# Patient Record
Sex: MALE | Race: WHITE | NOT HISPANIC OR LATINO | Employment: FULL TIME | URBAN - METROPOLITAN AREA
[De-identification: names, ages, dates, MRNs, and addresses within clinical notes are randomized per-mention and may not be internally consistent; named-entity substitution may affect disease eponyms.]

---

## 2020-06-03 ENCOUNTER — HISTORICAL (OUTPATIENT)
Dept: ADMINISTRATIVE | Facility: HOSPITAL | Age: 65
End: 2020-06-03

## 2022-04-11 ENCOUNTER — HISTORICAL (OUTPATIENT)
Dept: ADMINISTRATIVE | Facility: HOSPITAL | Age: 67
End: 2022-04-11

## 2022-04-27 VITALS
SYSTOLIC BLOOD PRESSURE: 120 MMHG | HEIGHT: 71 IN | WEIGHT: 240.31 LBS | DIASTOLIC BLOOD PRESSURE: 81 MMHG | BODY MASS INDEX: 33.64 KG/M2

## 2023-06-12 DIAGNOSIS — M54.50 LOW BACK PAIN: Primary | ICD-10-CM

## 2023-06-13 ENCOUNTER — TELEPHONE (OUTPATIENT)
Dept: NEUROSURGERY | Facility: CLINIC | Age: 68
End: 2023-06-13
Payer: COMMERCIAL

## 2023-06-19 NOTE — TELEPHONE ENCOUNTER
I called and scheduled the pt on 8/10/23 @ 130pm w/ Dr. Espinoza. The pt also informed that we should be getting disc of MRI any day now since he mailed it sometime last week.

## 2023-08-10 ENCOUNTER — OFFICE VISIT (OUTPATIENT)
Dept: NEUROSURGERY | Facility: CLINIC | Age: 68
End: 2023-08-10
Payer: COMMERCIAL

## 2023-08-10 ENCOUNTER — PATIENT MESSAGE (OUTPATIENT)
Dept: NEUROSURGERY | Facility: CLINIC | Age: 68
End: 2023-08-10

## 2023-08-10 VITALS
DIASTOLIC BLOOD PRESSURE: 67 MMHG | WEIGHT: 254 LBS | SYSTOLIC BLOOD PRESSURE: 118 MMHG | HEIGHT: 71 IN | RESPIRATION RATE: 16 BRPM | HEART RATE: 69 BPM | BODY MASS INDEX: 35.56 KG/M2

## 2023-08-10 DIAGNOSIS — M54.41 CHRONIC BILATERAL LOW BACK PAIN WITH BILATERAL SCIATICA: ICD-10-CM

## 2023-08-10 DIAGNOSIS — M51.26 LUMBAR HERNIATED DISC: ICD-10-CM

## 2023-08-10 DIAGNOSIS — M48.062 LUMBAR STENOSIS WITH NEUROGENIC CLAUDICATION: Primary | ICD-10-CM

## 2023-08-10 DIAGNOSIS — M54.42 CHRONIC BILATERAL LOW BACK PAIN WITH BILATERAL SCIATICA: ICD-10-CM

## 2023-08-10 DIAGNOSIS — G89.29 CHRONIC BILATERAL LOW BACK PAIN WITH BILATERAL SCIATICA: ICD-10-CM

## 2023-08-10 PROCEDURE — 3078F PR MOST RECENT DIASTOLIC BLOOD PRESSURE < 80 MM HG: ICD-10-PCS | Mod: CPTII,,, | Performed by: NEUROLOGICAL SURGERY

## 2023-08-10 PROCEDURE — 3074F SYST BP LT 130 MM HG: CPT | Mod: CPTII,,, | Performed by: NEUROLOGICAL SURGERY

## 2023-08-10 PROCEDURE — 3008F BODY MASS INDEX DOCD: CPT | Mod: CPTII,,, | Performed by: NEUROLOGICAL SURGERY

## 2023-08-10 PROCEDURE — 99204 PR OFFICE/OUTPT VISIT, NEW, LEVL IV, 45-59 MIN: ICD-10-PCS | Mod: ,,, | Performed by: NEUROLOGICAL SURGERY

## 2023-08-10 PROCEDURE — 1125F PR PAIN SEVERITY QUANTIFIED, PAIN PRESENT: ICD-10-PCS | Mod: CPTII,,, | Performed by: NEUROLOGICAL SURGERY

## 2023-08-10 PROCEDURE — 1160F RVW MEDS BY RX/DR IN RCRD: CPT | Mod: CPTII,,, | Performed by: NEUROLOGICAL SURGERY

## 2023-08-10 PROCEDURE — 1125F AMNT PAIN NOTED PAIN PRSNT: CPT | Mod: CPTII,,, | Performed by: NEUROLOGICAL SURGERY

## 2023-08-10 PROCEDURE — 3074F PR MOST RECENT SYSTOLIC BLOOD PRESSURE < 130 MM HG: ICD-10-PCS | Mod: CPTII,,, | Performed by: NEUROLOGICAL SURGERY

## 2023-08-10 PROCEDURE — 3008F PR BODY MASS INDEX (BMI) DOCUMENTED: ICD-10-PCS | Mod: CPTII,,, | Performed by: NEUROLOGICAL SURGERY

## 2023-08-10 PROCEDURE — 1159F MED LIST DOCD IN RCRD: CPT | Mod: CPTII,,, | Performed by: NEUROLOGICAL SURGERY

## 2023-08-10 PROCEDURE — 99204 OFFICE O/P NEW MOD 45 MIN: CPT | Mod: ,,, | Performed by: NEUROLOGICAL SURGERY

## 2023-08-10 PROCEDURE — 1159F PR MEDICATION LIST DOCUMENTED IN MEDICAL RECORD: ICD-10-PCS | Mod: CPTII,,, | Performed by: NEUROLOGICAL SURGERY

## 2023-08-10 PROCEDURE — 1160F PR REVIEW ALL MEDS BY PRESCRIBER/CLIN PHARMACIST DOCUMENTED: ICD-10-PCS | Mod: CPTII,,, | Performed by: NEUROLOGICAL SURGERY

## 2023-08-10 PROCEDURE — 3078F DIAST BP <80 MM HG: CPT | Mod: CPTII,,, | Performed by: NEUROLOGICAL SURGERY

## 2023-08-10 RX ORDER — IPRATROPIUM BROMIDE 42 UG/1
2 SPRAY, METERED NASAL 2 TIMES DAILY
COMMUNITY

## 2023-08-10 RX ORDER — CHOLECALCIFEROL (VITAMIN D3) 50 MCG
CAPSULE ORAL
COMMUNITY
Start: 2012-06-01

## 2023-08-10 RX ORDER — TRAZODONE HYDROCHLORIDE 150 MG/1
75 TABLET ORAL NIGHTLY
COMMUNITY
Start: 2023-07-30

## 2023-08-10 RX ORDER — OXYCODONE AND ACETAMINOPHEN 5; 325 MG/1; MG/1
1 TABLET ORAL EVERY 6 HOURS PRN
COMMUNITY
Start: 2023-05-31 | End: 2023-09-14 | Stop reason: ALTCHOICE

## 2023-08-10 RX ORDER — PREGABALIN 200 MG/1
200 CAPSULE ORAL 2 TIMES DAILY
COMMUNITY
Start: 2023-05-04 | End: 2023-09-14 | Stop reason: ALTCHOICE

## 2023-08-10 RX ORDER — ASPIRIN 325 MG
325 TABLET, DELAYED RELEASE (ENTERIC COATED) ORAL DAILY
COMMUNITY
Start: 2021-07-12

## 2023-08-10 RX ORDER — RIMEGEPANT SULFATE 75 MG/75MG
TABLET, ORALLY DISINTEGRATING ORAL
COMMUNITY
Start: 2023-07-03

## 2023-08-10 RX ORDER — BISACODYL 5 MG/1
1 TABLET, COATED ORAL DAILY
COMMUNITY
Start: 2012-06-01

## 2023-08-10 RX ORDER — HYDROCODONE BITARTRATE AND ACETAMINOPHEN 5; 325 MG/1; MG/1
1-2 TABLET ORAL EVERY 6 HOURS PRN
Status: ON HOLD | COMMUNITY
Start: 2023-08-02 | End: 2023-11-17 | Stop reason: HOSPADM

## 2023-08-10 RX ORDER — METOPROLOL SUCCINATE 25 MG/1
25 TABLET, EXTENDED RELEASE ORAL DAILY
COMMUNITY
Start: 2012-06-01

## 2023-08-10 RX ORDER — TAMSULOSIN HYDROCHLORIDE 0.4 MG/1
0.4 CAPSULE ORAL DAILY
COMMUNITY
Start: 2022-06-01

## 2023-08-10 RX ORDER — CHLORPHENIRAMINE MALEATE 4 MG
4 TABLET ORAL NIGHTLY
COMMUNITY
Start: 2021-06-30

## 2023-08-10 RX ORDER — FUROSEMIDE 40 MG/1
40 TABLET ORAL DAILY
COMMUNITY
Start: 2023-07-30

## 2023-08-10 RX ORDER — TRAMADOL HYDROCHLORIDE 50 MG/1
50 TABLET ORAL
COMMUNITY
Start: 2021-07-07 | End: 2023-09-14 | Stop reason: ALTCHOICE

## 2023-08-10 RX ORDER — EZETIMIBE 10 MG/1
10 TABLET ORAL DAILY
COMMUNITY
Start: 2023-08-06

## 2023-08-10 RX ORDER — MELOXICAM 7.5 MG/1
7.5 TABLET ORAL DAILY
Status: ON HOLD | COMMUNITY
Start: 2022-06-01 | End: 2023-11-17 | Stop reason: HOSPADM

## 2023-08-10 RX ORDER — ASPIRIN 325 MG
50 TABLET, DELAYED RELEASE (ENTERIC COATED) ORAL DAILY
COMMUNITY
Start: 2023-05-23

## 2023-08-10 RX ORDER — LEVOCETIRIZINE DIHYDROCHLORIDE 5 MG/1
5 TABLET, FILM COATED ORAL DAILY
COMMUNITY
Start: 2015-06-01

## 2023-08-10 RX ORDER — ATORVASTATIN CALCIUM 40 MG/1
40 TABLET, FILM COATED ORAL DAILY
COMMUNITY
Start: 2023-08-06

## 2023-08-10 NOTE — PATIENT INSTRUCTIONS
Mr. De La Vega is a 68 y.o. male who has a past medical history significant for hypertension, aortic valve replacement, and migraine headaches.  He is s/p 3 cervical spine surgeries 30 years ago in [place], [place] involving anterior fusions from C3-7.  The patient has also had 3 lumbar surgeries in the past including a L5-S1 ALIF on 09/05/2014 by Dr. Howell.   In addition, he is s/p bilateral L4-5 laminotomies on 03/06/2020 with a SI joint fusion by Dr. Calix.  Mr. De La Vega presents as a new patient in the neurosurgery clinic for chronic low back pain with radiation into his right-greater-than-left lateral legs with walking.  These symptoms have progressed in the last 9 months.  He has a normal motor exam with chronic numbness in his left lateral thigh and bilateral lower extremities below the knees distally to his feet. There are no signs of myelopathy.     I reviewed pertinent imaging studies with the patient and his wife.  A MRI lumbar spine without gadolinium demonstrates normal alignment.  Postoperative changes s/p a L5-S1 ALIF.  At L4-5, there is a left disc herniation with mild stenosis and severe left neuroforaminal narrowing.        PLAN:    Encounter Diagnoses   Name Primary?    Lumbar stenosis with neurogenic claudication Yes    Lumbar herniated disc     Chronic bilateral low back pain with bilateral sciatica      Orders Placed This Encounter   Procedures    X-Ray Lumbar Complete Including Flex And Ext    CT Lumbar Spine Without Contrast    Ambulatory referral/consult to Pain Clinic    Ambulatory referral/consult to Physical/Occupational Therapy        1.  I discussed with Mr. De La Vega that continued optimization of medical management is recommended for his chronic low back pain with neurogenic claudication.  However, if his symptoms do not improve, he may be a future candidate for surgery, specifically a redo lumbar surgery such as a left L4-5 diskectomy and right L4-5 laminotomy.    2.  I am  requesting the neurosurgery office staff to contact his cardiologist Dr. Amador Hurley in [place], [place].  Mr. De La Vega will need preoperative cardiac clearance as well as permission for him to discontinue taking aspirin 10 days preoperatively.    3.  I am ordering lumbar spine x-rays with AP lateral and flexion-extension views and a CT lumbar spine without contrast to further evaluate his spinal anatomy.  The patient will be contacted with these radiographic results and any updates to the plan of care.    4.  Mr. De La Vega is being referred to physical therapy.      5.  In addition, he is being referred to a pain management specialist for evaluation and consideration of a lumbar epidural steroid injection.    6.  Mr. De La Vega is recommended to follow up with this established urologist Dr. Salazar.     7.  The patient's BMI is elevated at 35.43.  We discussed weight loss goals with diet and exercise.  Maintaining a healthy weight and strengthening core muscles are important factors for reducing pain along the spine.     8.  Arrangements are being made for Mr. De La Vega to follow up in my neurosurgery clinic in 6 weeks.

## 2023-08-10 NOTE — PROGRESS NOTES
"[facility]   Neurosurgery      Jose De La Vega  MRN: [id number], CSN: [id number]      : 1955   Age: 68 y.o. male  Payor: [facility] BLUE SHIELD / Plan: BCBS ALL OUT OF STATE / Product Type: PPO /       Ref:  Order, Paper  No address on file    PCP: Harper, Primary Doctor    Visit Date: 8/10/2023     There is no problem list on file for this patient.      SUBJECTIVE:      CC:   Chief Complaint   Patient presents with    low back pain radiating down b LE       HPI:   Mr. De La Vega is a 68 y.o. male who has a past medical history significant for hypertension, aortic valve replacement, and migraine headaches.  He is s/p 3 cervical spine surgeries 30 years ago in [place], [place] involving anterior fusions from C3-7.  The patient has also had 3 lumbar surgeries in the past including a L5-S1 ALIF on 2014 by Dr. Howell.   In addition, he is s/p bilateral L4-5 laminotomies on 2020 with a SI joint fusion by Dr. Calix.  Mr. De La Vega presents as a new patient in the neurosurgery clinic for chronic low back pain with radiation into his right-greater-than-left lateral legs with walking.  These symptoms have progressed in the last 9 months.    Mr. De La Vega visits the neurosurgery clinic with his wife.  He reports improvement after each of his spinal surgeries.  However, in the last 9 months, the patient has been experiencing progressively worsening low back pain with radiation into his bilateral lateral legs.  He first noticed this in the right leg, which is followed by pain in his left leg.  With prolonged standing and walking, Mr. De La Vega has the sensation of his legs feeling "very heavy".  Since having shingles 5 months ago, the patient has been experiencing numbness in his left lateral thigh.  In addition, the patient has chronic numbness from his bilateral knees to his feet.  He has been ambulating without assistance, although there is a positive shopping cart sign.  There " have been no reports of bowel or bladder incontinence, although the patient describes recent issues with ED.     Mr. De La Vega is an [employer] .  He takes 1 hydrocodone on Sundays between Hindu services.  The patient also takes Lyrica with the chronic numbness in his bilateral feet.  Mr. De La Vega has not participated in physical therapy.  He is not currently being followed by a pain management specialist, but has completed lumbar epidural steroid injections several years ago.      There are no problems to display for this patient.    Past Medical History:   Diagnosis Date    Essential (primary) hypertension     High cholesterol     Lumbar pain     Migraine     Seasonal allergies      Past Surgical History:   Procedure Laterality Date    AORTIC VALVE REPLACEMENT      CERVICAL SPINE SURGERY      C4-C7    KNEE SURGERY Left     LUMBAR SPINE SURGERY      2015 & 2020    TONSILLECTOMY      VASECTOMY      WRIST SURGERY Right        Current Outpatient Medications:     aspirin (ECOTRIN) 325 MG EC tablet, Take 325 mg by mouth once daily., Disp: , Rfl:     atorvastatin (LIPITOR) 40 MG tablet, Take 40 mg by mouth once daily., Disp: , Rfl:     chlorpheniramine (CHLOR-TRIMETON) 4 mg tablet, Take 4 mg by mouth once daily., Disp: , Rfl:     co-enzyme Q-10 50 mg capsule, Take 50 mg by mouth., Disp: , Rfl:     ezetimibe (ZETIA) 10 mg tablet, Take 10 mg by mouth once daily., Disp: , Rfl:     furosemide (LASIX) 40 MG tablet, Take 40 mg by mouth once daily., Disp: , Rfl:     HYDROcodone-acetaminophen (NORCO) 5-325 mg per tablet, Take 1-2 tablets by mouth every 6 (six) hours as needed., Disp: , Rfl:     INV potassium chloride SA (K-DUR,KLOR-CON) 20 mEq Tab, , Disp: , Rfl:     ipratropium (ATROVENT) 42 mcg (0.06 %) nasal spray, 2 sprays by Nasal route 2 (two) times daily., Disp: , Rfl:     levocetirizine (XYZAL) 5 MG tablet, Take 5 mg by mouth once daily., Disp: , Rfl:     meloxicam (MOBIC) 7.5 MG tablet, Take 7.5 mg by mouth  once daily., Disp: , Rfl:     metoprolol succinate (TOPROL-XL) 25 MG 24 hr tablet, Take 25 mg by mouth once daily., Disp: , Rfl:     multivitamin-minerals-lutein (MULTIVITAMIN 50 PLUS) Tab, , Disp: , Rfl:     NURTEC 75 mg odt, SMARTSI Tablet(s) Sublingual Every Other Day, Disp: , Rfl:     omega 3-dha-epa-fish oil (FISH OIL) 100-160-1,000 mg Cap, , Disp: , Rfl:     oxyCODONE-acetaminophen (PERCOCET) 5-325 mg per tablet, Take 1 tablet by mouth every 6 (six) hours as needed., Disp: , Rfl:     pregabalin (LYRICA) 200 MG Cap, Take 200 mg by mouth 2 (two) times daily., Disp: , Rfl:     tamsulosin (FLOMAX) 0.4 mg Cap, Take 0.4 mg by mouth once daily., Disp: , Rfl:     testosterone 100 mg pellet, , Disp: , Rfl:     traMADoL (ULTRAM) 50 mg tablet, Take 50 mg by mouth., Disp: , Rfl:     traZODone (DESYREL) 150 MG tablet, Take 150 mg by mouth every evening., Disp: , Rfl:     Review of patient's allergies indicates:   Allergen Reactions    Sumatriptan Anaphylaxis    [id number] antimigraine agents        Social History     Tobacco Use    Smoking status: Never    Smokeless tobacco: Never   Substance Use Topics    Alcohol use: Not on file     Occupation: [employer]  currently living in [place], [place]. He previously resided in [place], [place].     History reviewed. No pertinent family history.    ROS:  Constitutional:  Negative for chills and fever.   HENT:  Negative for congestion and sore throat.    Eyes:  Negative for blurred vision and double vision.   Respiratory:  Negative for cough and shortness of breath.    Cardiovascular:  Negative for chest pain and palpitations.   Gastrointestinal:  Negative for constipation, diarrhea, nausea and vomiting.   Musculoskeletal:  Positive for back pain, Negative for neck pain.   Neurological:  Positive for sensory change, Negative for focal weakness and headaches.   Endo/Heme/Allergies:  Does not bruise/bleed easily.   Psychiatric/Behavioral:  Negative for depression and  "anxiety.      OBJECTIVE:     EXAMINATION:  /67 (BP Location: Left arm, Patient Position: Sitting)   Pulse 69   Resp 16   Ht 5' 11" (1.803 m)   Wt 115.2 kg (254 lb)   BMI 35.43 kg/m²   Body Habitus: Significantly Obese    Physical Exam:  Constitutional: The patient is well-developed and cooperative, sitting comfortably in a chair  Extremities: Edema in b LE, palpable DP pulses bilaterally    Mental Status:   Oriented to person, place, and time  Normal speech    Cranial nerves:  CN II: PERRL, 4 to 3 mm, brisk bilaterally  CN III, IV, and VI: extraocular movements normal, no ptosis  CN V: normal facial sensation and masseter function  CN VII: facial strength normal and symmetrical  CN VIII: hearing normal bilaterally  CN IX and X: swallowing and phonation normal  CN XI: shoulder shrug intact bilaterally  CN XII: tongue protrusion midline    Motor:  Muscle bulk: normal in all extremities  Tone: normal in all extremities    Upper extremities:  Deltoid: right 5/5; left 5/5  Biceps: right 5/5; left 5/5  Triceps: right 5/5; left 5/5  Wrist extensors: right 5/5; left 5/5  Wrist flexors: right 5/5; left 5/5  : right 5/5; left 5/5  Interosseous muscles: right 5/5; left 5/5    Lower extremities:  Hip flexors: right 5/5; left 5/5  Knee extensors: right 5/5; left 5/5  Knee flexors: right 5/5; left 5/5  Foot dorsiflexors: right 5/5; left 5/5  Foot plantar flexors: right 5/5; left 5/5  Extensor hallucis longus: right 5/5; left 5/5    Sensation:  Normal to light touch x 4 extremities, except for decreased sensation to light touch in left lateral thigh and bilateral knees distally to feet    Reflexes:  Biceps: right 1+/4; left 1+/4  Brachioradialis: right 1+/4; left 1+/4  Triceps: right 1+/4; left 1+/4  Knee: right 1+/4; left 1+/4  Ankle: right 0/4; left 0/4    Cruzs sign: right negative; left negative  Babinski: right downgoing; left downgoing  Clonus: right negative; left negative    Coordination:  Finger to " nose: right normal; left normal    Musculoskeletal:    Gait:  Toe walk- normal  Heel walk- normal  Tandem gait- normal    Straight leg test: right negative; left negative    Cervical: No pain with palpation, R transverse anterior neck scar  Upper back: No pain with palpation  Lower back: No pain with palpation, well healed midline scar      DIAGNOSTICS REVIEW OF IMAGING, LAB & OTHER STUDIES:  I have personally reviewed and evaluated the following reports as well as radiographic studies:    MRI lumbar spine without gadolinium, 05/09/2023- there is normal alignment.  Postoperative changes s/p a L5-S1 ALIF.  At L4-5, there is a left disc herniation with severe left neuroforaminal narrowing.      ASSESSMENT:  Mr. De La Vega is a 68 y.o. male who has a past medical history significant for hypertension, aortic valve replacement, and migraine headaches.  He is s/p 3 cervical spine surgeries 30 years ago in [place], [place] involving anterior fusions from C3-7.  The patient has also had 3 lumbar surgeries in the past including a L5-S1 ALIF on 09/05/2014 by Dr. Howell.   In addition, he is s/p bilateral L4-5 laminotomies on 03/06/2020 with a SI joint fusion by Dr. Calix.  Mr. De La Vega presents as a new patient in the neurosurgery clinic for chronic low back pain with radiation into his right-greater-than-left lateral legs with walking.  These symptoms have progressed in the last 9 months.  He has a normal motor exam with chronic numbness in his left lateral thigh and bilateral lower extremities below the knees distally to his feet. There are no signs of myelopathy.     I reviewed pertinent imaging studies with the patient and his wife.  A MRI lumbar spine without gadolinium demonstrates normal alignment.  Postoperative changes s/p a L5-S1 ALIF.  At L4-5, there is a left disc herniation with mild stenosis and severe left neuroforaminal narrowing.        PLAN:    Encounter Diagnoses   Name Primary?    Lumbar stenosis with  neurogenic claudication Yes    Lumbar herniated disc     Chronic bilateral low back pain with bilateral sciatica      Orders Placed This Encounter   Procedures    X-Ray Lumbar Complete Including Flex And Ext    CT Lumbar Spine Without Contrast    Ambulatory referral/consult to Pain Clinic    Ambulatory referral/consult to Physical/Occupational Therapy        1.  I discussed with Mr. De La Vega that continued optimization of medical management is recommended for his chronic low back pain with neurogenic claudication.  However, if his symptoms do not improve, he may be a future candidate for surgery, specifically a redo lumbar surgery such as a left L4-5 diskectomy and right L4-5 laminotomy.    2.  I am requesting the neurosurgery office staff to contact his cardiologist Dr. Amador Hurley in [place], [place].  Mr. De La Vega will need preoperative cardiac clearance as well as permission for him to discontinue taking aspirin 10 days preoperatively.    3.  I am ordering lumbar spine x-rays with AP lateral and flexion-extension views and a CT lumbar spine without contrast to further evaluate his spinal anatomy.  The patient will be contacted with these radiographic results and any updates to the plan of care.    4.  Mr. De La Vega is being referred to physical therapy.      5.  In addition, he is being referred to a pain management specialist for evaluation and consideration of a lumbar epidural steroid injection.    6.  Mr. De La Vega is recommended to follow up with this established urologist Dr. Salazar.     7.  The patient's BMI is elevated at 35.43.  We discussed weight loss goals with diet and exercise.  Maintaining a healthy weight and strengthening core muscles are important factors for reducing pain along the spine.     8.  Arrangements are being made for Mr. De La Vega to follow up in my neurosurgery clinic in 6 weeks.      This note will be sent to the patient's referring provider Paper Order and  primary care provider No, Primary Doctor.           Lexie Espinoza MD  Neurosurgeon

## 2023-08-22 ENCOUNTER — PATIENT MESSAGE (OUTPATIENT)
Dept: NEUROSURGERY | Facility: CLINIC | Age: 68
End: 2023-08-22
Payer: COMMERCIAL

## 2023-08-30 ENCOUNTER — PATIENT MESSAGE (OUTPATIENT)
Dept: NEUROSURGERY | Facility: CLINIC | Age: 68
End: 2023-08-30
Payer: COMMERCIAL

## 2023-09-07 ENCOUNTER — PATIENT MESSAGE (OUTPATIENT)
Dept: NEUROSURGERY | Facility: CLINIC | Age: 68
End: 2023-09-07
Payer: COMMERCIAL

## 2023-09-13 NOTE — PROGRESS NOTES
Subjective:      Patient ID: Jose De La Vega is a 68 y.o. male.    Chief Complaint: Back Pain (Referred from Dr Saleem to have injection of the lumbar. MRI results in chart. Has pain that radiates down the legs mostly the right. Has numbness and tingling. Takes RX which helps some. Present going to PT which is not really helping.  Pain level today is  6/10.)    Referred by: Lexie Espinoza MD     HPI:  This is a pleasant 68-year-old  male patient presenting as a new consult from neurosurgeon Dr. Anjeliac Espinoza to evaluate for chronic bilateral low back pain with bilateral sciatica along with lumbar stenosis with neurogenic claudication and lumbar herniated disc.  Patient no longer taking tramadol. He is taking pregabalin 150  mg BID.  He current meloxicam 7.5 mg daily and Norco 5/325 once a day and on a bad day takes this twice.     Patient has bilateral low back pain along with bilateral buttock pain that radiates primarily down the right lateral thigh and wraps to the lower right quadricep region.  On occasion he will have radiation down the left lateral thigh and stops at the knee.  Pain going down his leg feels like an electric shock.  Pain to his bilateral low back and buttocks feels like a toothache.  Most pressing pain is the pain going down the right leg.  He also has pertinent past surgical history of cervical and lumbar spinal surgery.  Pain is worse and will elevate to 08/09/2010 after standing and walking too long his legs feel heavy.  If he is grocery shopping for long periods of time he needs to lean forward.  He can perform normal activities of daily living without pain however he is not able to complete house hold chores or yd work in the pain will occasionally wake him up at night.  His location is a .  His pain will reduce sitting to a 2-3/10.  He completed 4 weeks of physical therapy and had to stop as it was exacerbating his pain to the back and legs.  He reports Norco dull  "the pain but it give minimal relief with the numbness.  He would like to proceed with a repeat epidural steroid injection down the right leg as that is the most problematic area.  Currently pain is not waking him up at night.              Vital signs:   Vitals:    09/14/23 0937   BP: 115/70   Pulse: 69   Temp: 98.1 °F (36.7 °C)   Weight: 113.4 kg (250 lb)   Height: 5' 11" (1.803 m)   PainSc:   6     Body mass index is 34.87 kg/m².  Pain Disability Index (PDI): 32       Interventional Pain History      ROS: Low back and leg pain    MRI lumbar spine [facility] 05/09/2023:: Low back and leg pain  (See full report in )          Objective:          Physical Exam  General: Well developed; overweight; A&O x 3; No anxiety/depression; NAD  Mental Status: Oriented to person, palce and time. Displays appropriate mood & affect.  Head: Norm cephalic and atraumatic  Neck:  No cervical paraspinal banding.  Full range of motion with lateral turning and cervical flexion +extension.  Eyes: normal conjunctiva, normal lids, normal pupils  ENT and mouth: normal external ear, nose, and no lesions noted on the lips.  Respiratory: Symmetrical, Unlabored. No dyspnea  CV: normal rhythm and rate. No peripheral edema.   Abdomen: Non-distended    Extremities:  Gen: No cyanosis or tenderness to palpation bilateral upper and lower extremities  Skin: Warm, pink, dry, no rashes, no lesions on the lumbar spine  Strength: 5/5 motor strength bilateral upper and lower extremities  ROM: Full ROM in bilateral knees and ankles without pain or instability.    Neuro:  Gait:  Positive antalgic lean, normal toe and heel raise. Independent ambulator.  DTR's: 2+ in bilateral patellar, and ankle  Sensory: Intact to light touch bilateral  upper and lower extremities with some numbness to bilateral toes occurred after his lumbar spinal surgery    Spine: Normal lordosis. No scoliosis  L-spine ROM: Full ROM to flexion, extension, bilateral " rotation,   Straight Leg Raise:  Positive right,   SI Joint: No tenderness to palpation bilaterally.               Assessment:     A/P:  Patient has ongoing chronic bilateral low back pain buttock that radiates primarily down the right lateral leg wrapping around to the right lower quad stopping at the knee in the L4 and L5 dermatome.  MRI lumbar spine shows L4-5 intervertebral disc with mild desiccation and diffuse disc protrusion slightly eccentric to the left.  There is left extraforaminal protrusion as well.  Bilateral facet overgrowth noted.  Changes cause moderate diffuse anterior canal and severe left foraminal stenosis.  Right exit foramina patent.    Request submitted for a right transforaminal epidural steroid injection to L4 +L5  Patient will follow-up for a preop appointment    Encounter Diagnoses   Name Primary?    Chronic bilateral low back pain with bilateral sciatica Yes    Lumbar stenosis with neurogenic claudication     Lumbar herniated disc          Plan:       Jose was seen today for back pain.    Diagnoses and all orders for this visit:    Chronic bilateral low back pain with bilateral sciatica  -     Ambulatory referral/consult to Pain Clinic    Lumbar stenosis with neurogenic claudication  -     Ambulatory referral/consult to Pain Clinic    Lumbar herniated disc  -     Ambulatory referral/consult to Pain Clinic               Past Medical History:   Diagnosis Date    Arthritis     Essential (primary) hypertension     Heart murmur     High cholesterol     Lumbar pain     Migraine     Seasonal allergies     Sleep apnea        Past Surgical History:   Procedure Laterality Date    AORTIC VALVE REPLACEMENT      CERVICAL SPINE SURGERY      C4-C7    KNEE SURGERY Left     LUMBAR SPINE SURGERY      2015 & 2020    TONSILLECTOMY      VASECTOMY      WRIST SURGERY Right        Family History   Problem Relation Age of Onset    No Known Problems Mother     No Known Problems Father        Social History      Socioeconomic History    Marital status:    Tobacco Use    Smoking status: Never    Smokeless tobacco: Never   Substance and Sexual Activity    Alcohol use: Yes     Alcohol/week: 2.0 standard drinks of alcohol     Types: 2 Shots of liquor per week    Drug use: Never       Current Outpatient Medications   Medication Sig Dispense Refill    aspirin (ECOTRIN) 325 MG EC tablet Take 325 mg by mouth once daily.      atorvastatin (LIPITOR) 40 MG tablet Take 40 mg by mouth once daily.      chlorpheniramine (CHLOR-TRIMETON) 4 mg tablet Take 4 mg by mouth once daily.      co-enzyme Q-10 50 mg capsule Take 50 mg by mouth.      ezetimibe (ZETIA) 10 mg tablet Take 10 mg by mouth once daily.      furosemide (LASIX) 40 MG tablet Take 40 mg by mouth once daily.      HYDROcodone-acetaminophen (NORCO) 5-325 mg per tablet Take 1-2 tablets by mouth every 6 (six) hours as needed.      INV potassium chloride SA (K-DUR,KLOR-CON) 20 mEq Tab       ipratropium (ATROVENT) 42 mcg (0.06 %) nasal spray 2 sprays by Nasal route 2 (two) times daily.      levocetirizine (XYZAL) 5 MG tablet Take 5 mg by mouth once daily.      meloxicam (MOBIC) 7.5 MG tablet Take 7.5 mg by mouth once daily.      metoprolol succinate (TOPROL-XL) 25 MG 24 hr tablet Take 25 mg by mouth once daily.      multivitamin-minerals-lutein (MULTIVITAMIN 50 PLUS) Tab       NURTEC 75 mg odt SMARTSI Tablet(s) Sublingual Every Other Day      omega 3-dha-epa-fish oil (FISH OIL) 100-160-1,000 mg Cap       tamsulosin (FLOMAX) 0.4 mg Cap Take 0.4 mg by mouth once daily.      testosterone 100 mg pellet       traZODone (DESYREL) 150 MG tablet Take 150 mg by mouth every evening.       No current facility-administered medications for this visit.       Review of patient's allergies indicates:   Allergen Reactions    Sumatriptan Anaphylaxis    [id number] antimigraine agents

## 2023-09-13 NOTE — H&P (VIEW-ONLY)
Subjective:      Patient ID: Jose De La Vega is a 68 y.o. male.    Chief Complaint: Back Pain (Referred from Dr Saleem to have injection of the lumbar. MRI results in chart. Has pain that radiates down the legs mostly the right. Has numbness and tingling. Takes RX which helps some. Present going to PT which is not really helping.  Pain level today is  6/10.)    Referred by: Lexie Espinoza MD     HPI:  This is a pleasant 68-year-old  male patient presenting as a new consult from neurosurgeon Dr. Anjelica Espinoza to evaluate for chronic bilateral low back pain with bilateral sciatica along with lumbar stenosis with neurogenic claudication and lumbar herniated disc.  Patient no longer taking tramadol. He is taking pregabalin 150  mg BID.  He current meloxicam 7.5 mg daily and Norco 5/325 once a day and on a bad day takes this twice.     Patient has bilateral low back pain along with bilateral buttock pain that radiates primarily down the right lateral thigh and wraps to the lower right quadricep region.  On occasion he will have radiation down the left lateral thigh and stops at the knee.  Pain going down his leg feels like an electric shock.  Pain to his bilateral low back and buttocks feels like a toothache.  Most pressing pain is the pain going down the right leg.  He also has pertinent past surgical history of cervical and lumbar spinal surgery.  Pain is worse and will elevate to 08/09/2010 after standing and walking too long his legs feel heavy.  If he is grocery shopping for long periods of time he needs to lean forward.  He can perform normal activities of daily living without pain however he is not able to complete house hold chores or yd work in the pain will occasionally wake him up at night.  His location is a .  His pain will reduce sitting to a 2-3/10.  He completed 4 weeks of physical therapy and had to stop as it was exacerbating his pain to the back and legs.  He reports Norco dull  "the pain but it give minimal relief with the numbness.  He would like to proceed with a repeat epidural steroid injection down the right leg as that is the most problematic area.  Currently pain is not waking him up at night.              Vital signs:   Vitals:    09/14/23 0937   BP: 115/70   Pulse: 69   Temp: 98.1 °F (36.7 °C)   Weight: 113.4 kg (250 lb)   Height: 5' 11" (1.803 m)   PainSc:   6     Body mass index is 34.87 kg/m².  Pain Disability Index (PDI): 32       Interventional Pain History      ROS: Low back and leg pain    MRI lumbar spine [facility] 05/09/2023:: Low back and leg pain  (See full report in )          Objective:          Physical Exam  General: Well developed; overweight; A&O x 3; No anxiety/depression; NAD  Mental Status: Oriented to person, palce and time. Displays appropriate mood & affect.  Head: Norm cephalic and atraumatic  Neck:  No cervical paraspinal banding.  Full range of motion with lateral turning and cervical flexion +extension.  Eyes: normal conjunctiva, normal lids, normal pupils  ENT and mouth: normal external ear, nose, and no lesions noted on the lips.  Respiratory: Symmetrical, Unlabored. No dyspnea  CV: normal rhythm and rate. No peripheral edema.   Abdomen: Non-distended    Extremities:  Gen: No cyanosis or tenderness to palpation bilateral upper and lower extremities  Skin: Warm, pink, dry, no rashes, no lesions on the lumbar spine  Strength: 5/5 motor strength bilateral upper and lower extremities  ROM: Full ROM in bilateral knees and ankles without pain or instability.    Neuro:  Gait:  Positive antalgic lean, normal toe and heel raise. Independent ambulator.  DTR's: 2+ in bilateral patellar, and ankle  Sensory: Intact to light touch bilateral  upper and lower extremities with some numbness to bilateral toes occurred after his lumbar spinal surgery    Spine: Normal lordosis. No scoliosis  L-spine ROM: Full ROM to flexion, extension, bilateral " rotation,   Straight Leg Raise:  Positive right,   SI Joint: No tenderness to palpation bilaterally.               Assessment:     A/P:  Patient has ongoing chronic bilateral low back pain buttock that radiates primarily down the right lateral leg wrapping around to the right lower quad stopping at the knee in the L4 and L5 dermatome.  MRI lumbar spine shows L4-5 intervertebral disc with mild desiccation and diffuse disc protrusion slightly eccentric to the left.  There is left extraforaminal protrusion as well.  Bilateral facet overgrowth noted.  Changes cause moderate diffuse anterior canal and severe left foraminal stenosis.  Right exit foramina patent.    Request submitted for a right transforaminal epidural steroid injection to L4 +L5  Patient will follow-up for a preop appointment    Encounter Diagnoses   Name Primary?    Chronic bilateral low back pain with bilateral sciatica Yes    Lumbar stenosis with neurogenic claudication     Lumbar herniated disc          Plan:       Jose was seen today for back pain.    Diagnoses and all orders for this visit:    Chronic bilateral low back pain with bilateral sciatica  -     Ambulatory referral/consult to Pain Clinic    Lumbar stenosis with neurogenic claudication  -     Ambulatory referral/consult to Pain Clinic    Lumbar herniated disc  -     Ambulatory referral/consult to Pain Clinic               Past Medical History:   Diagnosis Date    Arthritis     Essential (primary) hypertension     Heart murmur     High cholesterol     Lumbar pain     Migraine     Seasonal allergies     Sleep apnea        Past Surgical History:   Procedure Laterality Date    AORTIC VALVE REPLACEMENT      CERVICAL SPINE SURGERY      C4-C7    KNEE SURGERY Left     LUMBAR SPINE SURGERY      2015 & 2020    TONSILLECTOMY      VASECTOMY      WRIST SURGERY Right        Family History   Problem Relation Age of Onset    No Known Problems Mother     No Known Problems Father        Social History      Socioeconomic History    Marital status:    Tobacco Use    Smoking status: Never    Smokeless tobacco: Never   Substance and Sexual Activity    Alcohol use: Yes     Alcohol/week: 2.0 standard drinks of alcohol     Types: 2 Shots of liquor per week    Drug use: Never       Current Outpatient Medications   Medication Sig Dispense Refill    aspirin (ECOTRIN) 325 MG EC tablet Take 325 mg by mouth once daily.      atorvastatin (LIPITOR) 40 MG tablet Take 40 mg by mouth once daily.      chlorpheniramine (CHLOR-TRIMETON) 4 mg tablet Take 4 mg by mouth once daily.      co-enzyme Q-10 50 mg capsule Take 50 mg by mouth.      ezetimibe (ZETIA) 10 mg tablet Take 10 mg by mouth once daily.      furosemide (LASIX) 40 MG tablet Take 40 mg by mouth once daily.      HYDROcodone-acetaminophen (NORCO) 5-325 mg per tablet Take 1-2 tablets by mouth every 6 (six) hours as needed.      INV potassium chloride SA (K-DUR,KLOR-CON) 20 mEq Tab       ipratropium (ATROVENT) 42 mcg (0.06 %) nasal spray 2 sprays by Nasal route 2 (two) times daily.      levocetirizine (XYZAL) 5 MG tablet Take 5 mg by mouth once daily.      meloxicam (MOBIC) 7.5 MG tablet Take 7.5 mg by mouth once daily.      metoprolol succinate (TOPROL-XL) 25 MG 24 hr tablet Take 25 mg by mouth once daily.      multivitamin-minerals-lutein (MULTIVITAMIN 50 PLUS) Tab       NURTEC 75 mg odt SMARTSI Tablet(s) Sublingual Every Other Day      omega 3-dha-epa-fish oil (FISH OIL) 100-160-1,000 mg Cap       tamsulosin (FLOMAX) 0.4 mg Cap Take 0.4 mg by mouth once daily.      testosterone 100 mg pellet       traZODone (DESYREL) 150 MG tablet Take 150 mg by mouth every evening.       No current facility-administered medications for this visit.       Review of patient's allergies indicates:   Allergen Reactions    Sumatriptan Anaphylaxis    [id number] antimigraine agents

## 2023-09-14 ENCOUNTER — OFFICE VISIT (OUTPATIENT)
Dept: PAIN MEDICINE | Facility: CLINIC | Age: 68
End: 2023-09-14
Payer: COMMERCIAL

## 2023-09-14 VITALS
SYSTOLIC BLOOD PRESSURE: 115 MMHG | DIASTOLIC BLOOD PRESSURE: 70 MMHG | BODY MASS INDEX: 35 KG/M2 | WEIGHT: 250 LBS | HEART RATE: 69 BPM | TEMPERATURE: 98 F | HEIGHT: 71 IN

## 2023-09-14 DIAGNOSIS — M48.062 LUMBAR STENOSIS WITH NEUROGENIC CLAUDICATION: ICD-10-CM

## 2023-09-14 DIAGNOSIS — G89.29 CHRONIC BILATERAL LOW BACK PAIN WITH BILATERAL SCIATICA: Primary | ICD-10-CM

## 2023-09-14 DIAGNOSIS — M54.42 CHRONIC BILATERAL LOW BACK PAIN WITH BILATERAL SCIATICA: Primary | ICD-10-CM

## 2023-09-14 DIAGNOSIS — M51.26 LUMBAR HERNIATED DISC: ICD-10-CM

## 2023-09-14 DIAGNOSIS — M54.41 CHRONIC BILATERAL LOW BACK PAIN WITH BILATERAL SCIATICA: Primary | ICD-10-CM

## 2023-09-14 PROCEDURE — 1160F PR REVIEW ALL MEDS BY PRESCRIBER/CLIN PHARMACIST DOCUMENTED: ICD-10-PCS | Mod: CPTII,,, | Performed by: NURSE PRACTITIONER

## 2023-09-14 PROCEDURE — 1125F PR PAIN SEVERITY QUANTIFIED, PAIN PRESENT: ICD-10-PCS | Mod: CPTII,,, | Performed by: NURSE PRACTITIONER

## 2023-09-14 PROCEDURE — 3008F PR BODY MASS INDEX (BMI) DOCUMENTED: ICD-10-PCS | Mod: CPTII,,, | Performed by: NURSE PRACTITIONER

## 2023-09-14 PROCEDURE — 1160F RVW MEDS BY RX/DR IN RCRD: CPT | Mod: CPTII,,, | Performed by: NURSE PRACTITIONER

## 2023-09-14 PROCEDURE — 3008F BODY MASS INDEX DOCD: CPT | Mod: CPTII,,, | Performed by: NURSE PRACTITIONER

## 2023-09-14 PROCEDURE — 1100F PR PT FALLS ASSESS DOC 2+ FALLS/FALL W/INJURY/YR: ICD-10-PCS | Mod: CPTII,,, | Performed by: NURSE PRACTITIONER

## 2023-09-14 PROCEDURE — 3078F PR MOST RECENT DIASTOLIC BLOOD PRESSURE < 80 MM HG: ICD-10-PCS | Mod: CPTII,,, | Performed by: NURSE PRACTITIONER

## 2023-09-14 PROCEDURE — 1159F MED LIST DOCD IN RCRD: CPT | Mod: CPTII,,, | Performed by: NURSE PRACTITIONER

## 2023-09-14 PROCEDURE — 99204 OFFICE O/P NEW MOD 45 MIN: CPT | Mod: ,,, | Performed by: NURSE PRACTITIONER

## 2023-09-14 PROCEDURE — 3074F PR MOST RECENT SYSTOLIC BLOOD PRESSURE < 130 MM HG: ICD-10-PCS | Mod: CPTII,,, | Performed by: NURSE PRACTITIONER

## 2023-09-14 PROCEDURE — 3074F SYST BP LT 130 MM HG: CPT | Mod: CPTII,,, | Performed by: NURSE PRACTITIONER

## 2023-09-14 PROCEDURE — 3078F DIAST BP <80 MM HG: CPT | Mod: CPTII,,, | Performed by: NURSE PRACTITIONER

## 2023-09-14 PROCEDURE — 1159F PR MEDICATION LIST DOCUMENTED IN MEDICAL RECORD: ICD-10-PCS | Mod: CPTII,,, | Performed by: NURSE PRACTITIONER

## 2023-09-14 PROCEDURE — 1100F PTFALLS ASSESS-DOCD GE2>/YR: CPT | Mod: CPTII,,, | Performed by: NURSE PRACTITIONER

## 2023-09-14 PROCEDURE — 1125F AMNT PAIN NOTED PAIN PRSNT: CPT | Mod: CPTII,,, | Performed by: NURSE PRACTITIONER

## 2023-09-14 PROCEDURE — 3288F FALL RISK ASSESSMENT DOCD: CPT | Mod: CPTII,,, | Performed by: NURSE PRACTITIONER

## 2023-09-14 PROCEDURE — 3288F PR FALLS RISK ASSESSMENT DOCUMENTED: ICD-10-PCS | Mod: CPTII,,, | Performed by: NURSE PRACTITIONER

## 2023-09-14 PROCEDURE — 99204 PR OFFICE/OUTPT VISIT, NEW, LEVL IV, 45-59 MIN: ICD-10-PCS | Mod: ,,, | Performed by: NURSE PRACTITIONER

## 2023-10-02 ENCOUNTER — PATIENT MESSAGE (OUTPATIENT)
Dept: ADMINISTRATIVE | Facility: OTHER | Age: 68
End: 2023-10-02
Payer: COMMERCIAL

## 2023-10-03 ENCOUNTER — PATIENT MESSAGE (OUTPATIENT)
Dept: ADMINISTRATIVE | Facility: OTHER | Age: 68
End: 2023-10-03
Payer: COMMERCIAL

## 2023-10-04 ENCOUNTER — ANESTHESIA EVENT (OUTPATIENT)
Dept: SURGERY | Facility: HOSPITAL | Age: 68
End: 2023-10-04
Payer: COMMERCIAL

## 2023-10-04 RX ORDER — SODIUM CHLORIDE, SODIUM LACTATE, POTASSIUM CHLORIDE, CALCIUM CHLORIDE 600; 310; 30; 20 MG/100ML; MG/100ML; MG/100ML; MG/100ML
INJECTION, SOLUTION INTRAVENOUS CONTINUOUS
Status: CANCELLED | OUTPATIENT
Start: 2023-10-04

## 2023-10-04 RX ORDER — SODIUM CHLORIDE 9 MG/ML
INJECTION, SOLUTION INTRAVENOUS CONTINUOUS
Status: CANCELLED | OUTPATIENT
Start: 2023-10-04

## 2023-10-04 RX ORDER — SODIUM CHLORIDE, SODIUM GLUCONATE, SODIUM ACETATE, POTASSIUM CHLORIDE AND MAGNESIUM CHLORIDE 30; 37; 368; 526; 502 MG/100ML; MG/100ML; MG/100ML; MG/100ML; MG/100ML
INJECTION, SOLUTION INTRAVENOUS CONTINUOUS
Status: CANCELLED | OUTPATIENT
Start: 2023-10-04 | End: 2023-11-03

## 2023-10-04 RX ORDER — ONDANSETRON 2 MG/ML
4 INJECTION INTRAMUSCULAR; INTRAVENOUS DAILY PRN
Status: CANCELLED | OUTPATIENT
Start: 2023-10-04

## 2023-10-05 ENCOUNTER — PATIENT MESSAGE (OUTPATIENT)
Dept: ADMINISTRATIVE | Facility: OTHER | Age: 68
End: 2023-10-05
Payer: COMMERCIAL

## 2023-10-05 ENCOUNTER — ANESTHESIA (OUTPATIENT)
Dept: SURGERY | Facility: HOSPITAL | Age: 68
End: 2023-10-05
Payer: COMMERCIAL

## 2023-10-05 ENCOUNTER — HOSPITAL ENCOUNTER (OUTPATIENT)
Facility: HOSPITAL | Age: 68
Discharge: HOME | End: 2023-10-05
Attending: ANESTHESIOLOGY | Admitting: ANESTHESIOLOGY
Payer: COMMERCIAL

## 2023-10-05 DIAGNOSIS — G89.29 CHRONIC BACK PAIN GREATER THAN 3 MONTHS DURATION: ICD-10-CM

## 2023-10-05 DIAGNOSIS — M54.9 CHRONIC BACK PAIN GREATER THAN 3 MONTHS DURATION: ICD-10-CM

## 2023-10-05 PROCEDURE — 64483 NJX AA&/STRD TFRM EPI L/S 1: CPT | Mod: RT | Performed by: ANESTHESIOLOGY

## 2023-10-05 PROCEDURE — 64483 NJX AA&/STRD TFRM EPI L/S 1: CPT | Mod: RT,,, | Performed by: ANESTHESIOLOGY

## 2023-10-05 PROCEDURE — 63600175 PHARM REV CODE 636 W HCPCS: Performed by: ANESTHESIOLOGY

## 2023-10-05 PROCEDURE — 63600175 PHARM REV CODE 636 W HCPCS: Performed by: NURSE ANESTHETIST, CERTIFIED REGISTERED

## 2023-10-05 PROCEDURE — D9220A PRA ANESTHESIA: ICD-10-PCS | Mod: ,,, | Performed by: NURSE ANESTHETIST, CERTIFIED REGISTERED

## 2023-10-05 PROCEDURE — D9220A PRA ANESTHESIA: Mod: ,,, | Performed by: NURSE ANESTHETIST, CERTIFIED REGISTERED

## 2023-10-05 PROCEDURE — 25000003 PHARM REV CODE 250: Performed by: NURSE ANESTHETIST, CERTIFIED REGISTERED

## 2023-10-05 PROCEDURE — 64483 PR EPIDURAL INJ, ANES/STEROID, TRANSFORAMINAL, LUMB/SACR, SNGL LEVL: ICD-10-PCS | Mod: RT,,, | Performed by: ANESTHESIOLOGY

## 2023-10-05 PROCEDURE — 37000008 HC ANESTHESIA 1ST 15 MINUTES: Performed by: ANESTHESIOLOGY

## 2023-10-05 PROCEDURE — 25000003 PHARM REV CODE 250: Performed by: ANESTHESIOLOGY

## 2023-10-05 RX ORDER — LIDOCAINE HYDROCHLORIDE 10 MG/ML
INJECTION, SOLUTION EPIDURAL; INFILTRATION; INTRACAUDAL; PERINEURAL
Status: DISCONTINUED
Start: 2023-10-05 | End: 2023-10-05 | Stop reason: HOSPADM

## 2023-10-05 RX ORDER — DEXAMETHASONE SODIUM PHOSPHATE 10 MG/ML
INJECTION INTRAMUSCULAR; INTRAVENOUS
Status: DISCONTINUED
Start: 2023-10-05 | End: 2023-10-05 | Stop reason: HOSPADM

## 2023-10-05 RX ORDER — DEXAMETHASONE SODIUM PHOSPHATE 10 MG/ML
INJECTION INTRAMUSCULAR; INTRAVENOUS
Status: DISCONTINUED | OUTPATIENT
Start: 2023-10-05 | End: 2023-10-05 | Stop reason: HOSPADM

## 2023-10-05 RX ORDER — PROPOFOL 10 MG/ML
VIAL (ML) INTRAVENOUS
Status: DISCONTINUED | OUTPATIENT
Start: 2023-10-05 | End: 2023-10-05

## 2023-10-05 RX ORDER — LIDOCAINE HYDROCHLORIDE 10 MG/ML
INJECTION, SOLUTION EPIDURAL; INFILTRATION; INTRACAUDAL; PERINEURAL
Status: DISCONTINUED | OUTPATIENT
Start: 2023-10-05 | End: 2023-10-05

## 2023-10-05 RX ORDER — BUPIVACAINE HYDROCHLORIDE 2.5 MG/ML
INJECTION, SOLUTION EPIDURAL; INFILTRATION; INTRACAUDAL
Status: DISCONTINUED | OUTPATIENT
Start: 2023-10-05 | End: 2023-10-05 | Stop reason: HOSPADM

## 2023-10-05 RX ORDER — LIDOCAINE HYDROCHLORIDE 10 MG/ML
INJECTION, SOLUTION EPIDURAL; INFILTRATION; INTRACAUDAL; PERINEURAL
Status: DISCONTINUED | OUTPATIENT
Start: 2023-10-05 | End: 2023-10-05 | Stop reason: HOSPADM

## 2023-10-05 RX ADMIN — PROPOFOL 80 MG: 10 INJECTION, EMULSION INTRAVENOUS at 08:10

## 2023-10-05 RX ADMIN — LIDOCAINE HYDROCHLORIDE 20 MG: 10 INJECTION, SOLUTION EPIDURAL; INFILTRATION; INTRACAUDAL; PERINEURAL at 08:10

## 2023-10-05 NOTE — TRANSFER OF CARE
"Anesthesia Transfer of Care Note    Patient: Jose Trimble [facility]    Procedure(s) Performed: Procedure(s) (LRB):  INJECTION, STEROID, EPIDURAL, TRANSFORAMINAL APPROACH Right L4 L5 (Right)    Patient location: OPS    Anesthesia Type: MAC    Transport from OR: Transported from OR on room air with adequate spontaneous ventilation    Post pain: adequate analgesia    Post assessment: no apparent anesthetic complications    Post vital signs: stable    Level of consciousness: awake, alert and oriented    Complications: none    Transfer of care protocol was followed      Last vitals:   Visit Vitals  /72   Pulse 61   Temp 36.8 °C (98.2 °F) (Oral)   Ht 5' 11" (1.803 m)   Wt 113.8 kg (250 lb 14.1 oz)   SpO2 97%   BMI 34.99 kg/m²     "

## 2023-10-05 NOTE — ANESTHESIA PREPROCEDURE EVALUATION
"                                                                                                             10/05/2023  Jose De La Vega is a 68 y.o., male   Pre-operative evaluation for Procedure(s) (LRB):  INJECTION, STEROID, EPIDURAL, TRANSFORAMINAL APPROACH Right L4 L5 (Right)    /72   Pulse 61   Temp 36.8 °C (98.2 °F) (Oral)   Ht 5' 11" (1.803 m)   Wt 113.8 kg (250 lb 14.1 oz)   SpO2 97%   BMI 34.99 kg/m²     Past Medical History:   Diagnosis Date    Arthritis     Essential (primary) hypertension     Heart murmur     High cholesterol     Lumbar pain     Migraine     Seasonal allergies     Sleep apnea        There is no problem list on file for this patient.      Review of patient's allergies indicates:   Allergen Reactions    Sumatriptan Anaphylaxis    [id number] antimigraine agents        Current Outpatient Medications   Medication Instructions    aspirin (ECOTRIN) 325 mg, Oral, Daily    atorvastatin (LIPITOR) 40 mg, Oral, Daily    chlorpheniramine (CHLOR-TRIMETON) 4 mg, Oral, Daily    co-enzyme Q-10 50 mg, Oral    ezetimibe (ZETIA) 10 mg, Oral, Daily    furosemide (LASIX) 40 mg, Oral, Daily    HYDROcodone-acetaminophen (NORCO) 5-325 mg per tablet 1-2 tablets, Oral, Every 6 hours PRN    INV potassium chloride SA (K-DUR,KLOR-CON) 20 mEq Tab No dose, route, or frequency recorded.    ipratropium (ATROVENT) 42 mcg (0.06 %) nasal spray 2 sprays, Nasal, 2 times daily    levocetirizine (XYZAL) 5 mg, Oral, Daily    meloxicam (MOBIC) 7.5 mg, Oral, Daily    metoprolol succinate (TOPROL-XL) 25 mg, Oral, Daily    multivitamin-minerals-lutein (MULTIVITAMIN 50 PLUS) Tab No dose, route, or frequency recorded.    NURTEC 75 mg odt SMARTSI Tablet(s) Sublingual Every Other Day    omega 3-dha-epa-fish oil (FISH OIL) 100-160-1,000 mg Cap No dose, route, or frequency recorded.    tamsulosin (FLOMAX) 0.4 mg, Oral, Daily    testosterone 100 mg pellet No dose, route, or frequency " "recorded.    traZODone (DESYREL) 150 mg, Oral, Nightly       Past Surgical History:   Procedure Laterality Date    AORTIC VALVE REPLACEMENT      CERVICAL SPINE SURGERY      C4-C7    KNEE SURGERY Left     LUMBAR SPINE SURGERY      2015 & 2020    TONSILLECTOMY      VASECTOMY      WRIST SURGERY Right          Lab Results   Component Value Date    WBC 11.1 07/09/2021    HGB 12.9 (L) 07/09/2021    HCT 38.3 (L) 07/09/2021    MCV 86.7 07/09/2021     (L) 07/09/2021          BMP  Lab Results   Component Value Date     07/09/2021    K 4.4 07/09/2021    CHLORIDE 108 (H) 07/09/2021    CO2 23 07/09/2021    GLUCOSE 100 07/09/2021    BUN 7.0 (L) 07/09/2021    CREATININE 0.74 07/09/2021    CALCIUM 8.6 (L) 07/09/2021    EGFRNONAA >60 07/09/2021        INR  No results for input(s): "PT", "INR", "PROTIME", "APTT" in the last 72 hours.        Diagnostic Studies:      EKG:  No results found for this or any previous visit.    No results found for this or any previous visit.        .      Pre-op Assessment    I have reviewed the Patient Summary Reports.    I have reviewed the NPO Status.   I have reviewed the Medications.     Review of Systems  Anesthesia Hx:  No problems with previous Anesthesia  Denies Family Hx of Anesthesia complications.   Denies Personal Hx of Anesthesia complications.   Cardiovascular:  Cardiovascular Normal  No Cardiac Complaints   Pulmonary:  Pulmonary Normal No Pulmonary Complaints   Hepatic/GI:   No Current GERD Sx       Physical Exam  General: Alert and Oriented    Airway:  Mallampati: II   Mouth Opening: Normal  TM Distance: Normal  Tongue: Normal  Neck ROM: Normal ROM    Dental:  Intact    Chest/Lungs:  Clear to auscultation, Normal Respiratory Rate    Heart:  Rate: Normal  Rhythm: Regular Rhythm        Anesthesia Plan  Type of Anesthesia, risks & benefits discussed:    Anesthesia Type: Gen Natural Airway  Intra-op Monitoring Plan: Standard ASA Monitors  Post Op Pain Control Plan: " multimodal analgesia  Induction:  IV  Airway Plan: Direct  Informed Consent: Informed consent signed with the Patient and all parties understand the risks and agree with anesthesia plan.  All questions answered.   ASA Score: 3  Day of Surgery Review of History & Physical: H&P Update referred to the surgeon/provider.  Anesthesia Plan Notes: Nasal cannula vs facemask supplemental oxygenation   For patients with OLIVER/obesity, may consider SuperNoval Nasal CPAP      Poss conversion to General LMA/ROSANA discussed          Ready For Surgery From Anesthesia Perspective.     .

## 2023-10-05 NOTE — ANESTHESIA POSTPROCEDURE EVALUATION
Anesthesia Post Evaluation    Patient: Jose Trimble [facility]    Procedure(s) Performed: Procedure(s) (LRB):  INJECTION, STEROID, EPIDURAL, TRANSFORAMINAL APPROACH Right L4 L5 (Right)    Final Anesthesia Type: MAC      Patient location during evaluation: OPS  Patient participation: Yes- Able to Participate  Level of consciousness: awake and alert and oriented  Post-procedure vital signs: reviewed and stable  Airway patency: patent    PONV status at discharge: No PONV  Anesthetic complications: no      Cardiovascular status: blood pressure returned to baseline  Respiratory status: unassisted  Hydration status: euvolemic  Follow-up not needed.          Vitals Value Taken Time   /72 10/05/23 0729   Temp 36.8 °C (98.2 °F) 10/05/23 0729   Pulse 61 10/05/23 0729   Resp 16 10/05/23 0901   SpO2 97 % 10/05/23 0729         No case tracking events are documented in the log.      Pain/Dinorah Score: No data recorded

## 2023-10-05 NOTE — OP NOTE
Procedure:    Right L4  transforaminal epidural steroid injection    Right L5  transforaminal epidural steroid injection    Pre-Procedure Diagnoses:  Chronic back pain greater than 3 months  Lumbar degenerative disc disease  Lumbar radiculopathy  Lumbar disc displacement    Post-Procedure Diagnoses:  Chronic back pain greater than 3 months  Lumbar degenerative disc disease  Lumbar radiculopathy  Lumbar disc displacement    Anesthesia:  Local and MAC    Estimated Blood Loss:  < 2 ML    Consent:  The procedure, risks, benefits, and alternatives were discussed with the patient.  The patient voiced understanding and fully informed written consent was obtained.    Description of the Procedure:  The patient was taken to the operating room and placed in the prone position. The skin overlying the lumbar spine was prepped with Chloraprep and draped in the usual sterile fashion.  An oblique fluoroscopic view was obtained on the right side at L4, with the superior articular process of the inferior vertebral body aligned with the pedicle.  Skin anesthesia was achieved using 2 mL of lidocaine 1%.  A 22-gauge 5 -inch Quinke spinal needle was inserted and advanced under intermittent fluoroscopic views into the epidural space. Proper needle position was confirmed under AP, oblique, and lateral fluoroscopic views.  Negative aspiration for blood or CSF was confirmed. 1 mL of contrast was injected, which revealed spread into the epidural space.  Then a combination of 5 mg of dexamethasone with 1 mL of 0.25% bupivacaine was easily injected.   There was no pain on injection. The needle was removed intact and bleeding was nil.  The same procedure was repeated in identical fashion on the right side at L5 .  Sterile bandages were applied. The patient was taken to the recovery room for further observation in stable condition. The patient was then discharged home with no complications.

## 2023-10-05 NOTE — DISCHARGE SUMMARY
[facility] Surgical - Periop Services  Discharge Note  Short Stay    Procedure(s) (LRB):  INJECTION, STEROID, EPIDURAL, TRANSFORAMINAL APPROACH Right L4 L5 (Right)      OUTCOME: Patient tolerated treatment/procedure well without complication and is now ready for discharge.    DISPOSITION: Home or Self Care    FINAL DIAGNOSIS:  <principal problem not specified>    FOLLOWUP: In clinic    DISCHARGE INSTRUCTIONS:  No discharge procedures on file.     TIME SPENT ON DISCHARGE: 5 minutes

## 2023-10-07 VITALS
BODY MASS INDEX: 35.12 KG/M2 | HEART RATE: 63 BPM | TEMPERATURE: 98 F | SYSTOLIC BLOOD PRESSURE: 112 MMHG | HEIGHT: 71 IN | WEIGHT: 250.88 LBS | DIASTOLIC BLOOD PRESSURE: 76 MMHG | OXYGEN SATURATION: 96 %

## 2023-10-10 ENCOUNTER — OFFICE VISIT (OUTPATIENT)
Dept: NEUROSURGERY | Facility: CLINIC | Age: 68
End: 2023-10-10
Payer: COMMERCIAL

## 2023-10-10 VITALS
DIASTOLIC BLOOD PRESSURE: 73 MMHG | HEART RATE: 71 BPM | SYSTOLIC BLOOD PRESSURE: 123 MMHG | RESPIRATION RATE: 16 BRPM | BODY MASS INDEX: 34.97 KG/M2 | HEIGHT: 71 IN | WEIGHT: 249.81 LBS

## 2023-10-10 DIAGNOSIS — M48.062 LUMBAR STENOSIS WITH NEUROGENIC CLAUDICATION: ICD-10-CM

## 2023-10-10 DIAGNOSIS — M43.16 SPONDYLOLISTHESIS OF LUMBAR REGION: Primary | ICD-10-CM

## 2023-10-10 DIAGNOSIS — G89.29 CHRONIC BILATERAL LOW BACK PAIN WITH BILATERAL SCIATICA: ICD-10-CM

## 2023-10-10 DIAGNOSIS — M51.26 LUMBAR HERNIATED DISC: ICD-10-CM

## 2023-10-10 DIAGNOSIS — M54.42 CHRONIC BILATERAL LOW BACK PAIN WITH BILATERAL SCIATICA: ICD-10-CM

## 2023-10-10 DIAGNOSIS — M53.2X6 LUMBAR SPINE INSTABILITY: ICD-10-CM

## 2023-10-10 DIAGNOSIS — M54.41 CHRONIC BILATERAL LOW BACK PAIN WITH BILATERAL SCIATICA: ICD-10-CM

## 2023-10-10 PROCEDURE — 3078F PR MOST RECENT DIASTOLIC BLOOD PRESSURE < 80 MM HG: ICD-10-PCS | Mod: CPTII,,, | Performed by: NEUROLOGICAL SURGERY

## 2023-10-10 PROCEDURE — 99214 PR OFFICE/OUTPT VISIT, EST, LEVL IV, 30-39 MIN: ICD-10-PCS | Mod: ,,, | Performed by: NEUROLOGICAL SURGERY

## 2023-10-10 PROCEDURE — 1159F MED LIST DOCD IN RCRD: CPT | Mod: CPTII,,, | Performed by: NEUROLOGICAL SURGERY

## 2023-10-10 PROCEDURE — 1101F PT FALLS ASSESS-DOCD LE1/YR: CPT | Mod: CPTII,,, | Performed by: NEUROLOGICAL SURGERY

## 2023-10-10 PROCEDURE — 1125F PR PAIN SEVERITY QUANTIFIED, PAIN PRESENT: ICD-10-PCS | Mod: CPTII,,, | Performed by: NEUROLOGICAL SURGERY

## 2023-10-10 PROCEDURE — 1101F PR PT FALLS ASSESS DOC 0-1 FALLS W/OUT INJ PAST YR: ICD-10-PCS | Mod: CPTII,,, | Performed by: NEUROLOGICAL SURGERY

## 2023-10-10 PROCEDURE — 3078F DIAST BP <80 MM HG: CPT | Mod: CPTII,,, | Performed by: NEUROLOGICAL SURGERY

## 2023-10-10 PROCEDURE — 99214 OFFICE O/P EST MOD 30 MIN: CPT | Mod: ,,, | Performed by: NEUROLOGICAL SURGERY

## 2023-10-10 PROCEDURE — 3288F PR FALLS RISK ASSESSMENT DOCUMENTED: ICD-10-PCS | Mod: CPTII,,, | Performed by: NEUROLOGICAL SURGERY

## 2023-10-10 PROCEDURE — 1160F PR REVIEW ALL MEDS BY PRESCRIBER/CLIN PHARMACIST DOCUMENTED: ICD-10-PCS | Mod: CPTII,,, | Performed by: NEUROLOGICAL SURGERY

## 2023-10-10 PROCEDURE — 1125F AMNT PAIN NOTED PAIN PRSNT: CPT | Mod: CPTII,,, | Performed by: NEUROLOGICAL SURGERY

## 2023-10-10 PROCEDURE — 1159F PR MEDICATION LIST DOCUMENTED IN MEDICAL RECORD: ICD-10-PCS | Mod: CPTII,,, | Performed by: NEUROLOGICAL SURGERY

## 2023-10-10 PROCEDURE — 3074F SYST BP LT 130 MM HG: CPT | Mod: CPTII,,, | Performed by: NEUROLOGICAL SURGERY

## 2023-10-10 PROCEDURE — 1160F RVW MEDS BY RX/DR IN RCRD: CPT | Mod: CPTII,,, | Performed by: NEUROLOGICAL SURGERY

## 2023-10-10 PROCEDURE — 3008F PR BODY MASS INDEX (BMI) DOCUMENTED: ICD-10-PCS | Mod: CPTII,,, | Performed by: NEUROLOGICAL SURGERY

## 2023-10-10 PROCEDURE — 3288F FALL RISK ASSESSMENT DOCD: CPT | Mod: CPTII,,, | Performed by: NEUROLOGICAL SURGERY

## 2023-10-10 PROCEDURE — 3074F PR MOST RECENT SYSTOLIC BLOOD PRESSURE < 130 MM HG: ICD-10-PCS | Mod: CPTII,,, | Performed by: NEUROLOGICAL SURGERY

## 2023-10-10 PROCEDURE — 3008F BODY MASS INDEX DOCD: CPT | Mod: CPTII,,, | Performed by: NEUROLOGICAL SURGERY

## 2023-10-10 RX ORDER — PREDNISONE 20 MG/1
40 TABLET ORAL
COMMUNITY
Start: 2023-09-06 | End: 2023-10-10

## 2023-10-10 RX ORDER — METHYLPREDNISOLONE 4 MG/1
TABLET ORAL
COMMUNITY
Start: 2023-05-02 | End: 2023-10-10

## 2023-10-10 RX ORDER — TADALAFIL 5 MG/1
5 TABLET ORAL DAILY
COMMUNITY
Start: 2023-09-18

## 2023-10-10 RX ORDER — FLUTICASONE PROPIONATE 50 MCG
1 SPRAY, SUSPENSION (ML) NASAL 2 TIMES DAILY
COMMUNITY
Start: 2023-10-06

## 2023-10-10 RX ORDER — PREGABALIN 150 MG/1
CAPSULE ORAL
COMMUNITY
Start: 2023-05-01 | End: 2023-10-10

## 2023-10-10 RX ORDER — MELOXICAM 15 MG/1
15 TABLET ORAL
COMMUNITY
Start: 2023-08-08 | End: 2023-10-10

## 2023-10-10 RX ORDER — SEMAGLUTIDE 0.5 MG/.5ML
INJECTION, SOLUTION SUBCUTANEOUS WEEKLY
COMMUNITY
Start: 2023-10-09 | End: 2024-02-08

## 2023-10-10 RX ORDER — SEMAGLUTIDE 0.25 MG/.5ML
0.5 INJECTION, SOLUTION SUBCUTANEOUS WEEKLY
COMMUNITY
Start: 2023-09-18 | End: 2023-10-10

## 2023-10-10 RX ORDER — PREGABALIN 75 MG/1
75 CAPSULE ORAL 2 TIMES DAILY
COMMUNITY
Start: 2023-04-26 | End: 2023-10-10

## 2023-10-10 RX ORDER — CYCLOBENZAPRINE HCL 5 MG
5 TABLET ORAL NIGHTLY PRN
COMMUNITY
Start: 2023-09-06 | End: 2023-10-31 | Stop reason: SDUPTHER

## 2023-10-10 NOTE — PROGRESS NOTES
[facility]   Neurosurgery      Jose De La Vega  MRN: [id number], CSN: [id number]      : 1955   Age: 68 y.o. male  Payor: [employer] / Plan: BCBS ALL OUT OF STATE / Product Type: PPO /       Ref:  No referring provider defined for this encounter.    PCP: Jose Francisco Hurley MD    Visit Date: 10/10/2023     There is no problem list on file for this patient.      SUBJECTIVE:      CC:   Chief Complaint   Patient presents with    Low back pain with radiation down L>R legs       HPI:   Mr. De La Vega is a 68 y.o. male who has a past medical history significant for hypertension, aortic valve replacement, and migraine headaches.  He is s/p 3 cervical spine surgeries 30 years ago in [place], [place] involving anterior fusions from C3-7.  The patient has also had 3 lumbar surgeries in the past including a L5-S1 ALIF (Globus) on 2014 by Dr. Howell.   In addition, he is s/p bilateral L4-5 laminotomies on 2020 with a SI joint fusion by Dr. Calix.  Mr. De La Vega presents as a follow up patient in the neurosurgery clinic for chronic low back pain with radiation into his right-greater-than-left lateral legs with walking.  These symptoms have progressed in the last year.    The patient's last visit in my neurosurgery clinic was 08/10/2023.  I discussed with Mr. De La Vega that continued optimization of medical management was recommended for his chronic low back pain with neurogenic claudication.  However, if his symptoms did not improve, he may be a future candidate for surgery, specifically a redo lumbar surgery such as a left L4-5 diskectomy and right L4-5 laminotomy.  A request was made to complete preoperative cardiac clearance from Dr. Amador Hurley in [place], [place] as well as permission for him to discontinue taking aspirin 10 days preoperatively.  Lumbar spine x-rays and a CT lumbar spine without contrast were ordered and completed.  Mr. De La Vega was referred  to physical therapy and also referred to a pain management specialist.  The patient was recommended to follow up with this established urologist Dr. Salazar.  Finally, weight loss goals were discussed.    Mr. De La Vega has not yet completed cardiac clearance.  He completed several sessions of physical therapy with no significant improvement.  A lumbar epidural steroid injection under the care of Dr. Mcgee on 10/05/2023 resulted in 30% improvement in his right leg pain 1 day of the procedure.  However, his left leg pain has been worsening.  The patient has low back pain that has not improved.  He has not been reevaluated by urologist Dr. Salazar in the interim.  There have been no significant changes in his weight, although he is currently undergoing weekly IM injections of Wegovy for weight loss.     The patient continues to have pain in his right lower back radiation into his right-greater-than-left lateral upper legs.  There is chronic numbness in his bilateral lower legs distal to the knees and especially in his bilateral great toes recently.  The patient does not have any focal weakness or bowel/ bladder incontinence.  Mr. De La Vega reports recent issues ED. He has been ambulating without assistance, although there is a positive shopping cart sign.     Mr. De La Vega is an [employer]  and previously was an  with a specialist in malpractice cases in [place], [place]. He currently takes 1 hydrocodone on Sundays between [employer] services. The patient also takes Lyrica with the chronic numbness in his bilateral feet.        There are no problems to display for this patient.    Past Medical History:   Diagnosis Date    Arthritis     Essential (primary) hypertension     Heart murmur     High cholesterol     Lumbar pain     Migraine     Seasonal allergies     Sleep apnea      Past Surgical History:   Procedure Laterality Date    AORTIC VALVE REPLACEMENT      CERVICAL SPINE SURGERY      C4-C7    KNEE  SURGERY Left     LUMBAR SPINE SURGERY       &     TONSILLECTOMY      TRANSFORAMINAL EPIDURAL INJECTION OF STEROID Right 10/5/2023    Procedure: INJECTION, STEROID, EPIDURAL, TRANSFORAMINAL APPROACH Right L4 L5;  Surgeon: Florence Mcgee MD;  Location: [facility] OR;  Service: Pain Management;  Laterality: Right;  Rt L4 & L5    VASECTOMY      WRIST SURGERY Right        Current Outpatient Medications:     aspirin (ECOTRIN) 325 MG EC tablet, Take 325 mg by mouth once daily., Disp: , Rfl:     atorvastatin (LIPITOR) 40 MG tablet, Take 40 mg by mouth once daily., Disp: , Rfl:     chlorpheniramine (CHLOR-TRIMETON) 4 mg tablet, Take 4 mg by mouth once daily., Disp: , Rfl:     co-enzyme Q-10 50 mg capsule, Take 50 mg by mouth., Disp: , Rfl:     cyclobenzaprine (FLEXERIL) 5 MG tablet, Take 5 mg by mouth nightly as needed., Disp: , Rfl:     ezetimibe (ZETIA) 10 mg tablet, Take 10 mg by mouth once daily., Disp: , Rfl:     fluticasone propionate (FLONASE) 50 mcg/actuation nasal spray, 1 spray by Each Nostril route 2 (two) times daily., Disp: , Rfl:     furosemide (LASIX) 40 MG tablet, Take 40 mg by mouth once daily., Disp: , Rfl:     HYDROcodone-acetaminophen (NORCO) 5-325 mg per tablet, Take 1-2 tablets by mouth every 6 (six) hours as needed., Disp: , Rfl:     INV potassium chloride SA (K-DUR,KLOR-CON) 20 mEq Tab, , Disp: , Rfl:     ipratropium (ATROVENT) 42 mcg (0.06 %) nasal spray, 2 sprays by Nasal route 2 (two) times daily., Disp: , Rfl:     levocetirizine (XYZAL) 5 MG tablet, Take 5 mg by mouth once daily., Disp: , Rfl:     meloxicam (MOBIC) 7.5 MG tablet, Take 7.5 mg by mouth once daily., Disp: , Rfl:     metoprolol succinate (TOPROL-XL) 25 MG 24 hr tablet, Take 25 mg by mouth once daily., Disp: , Rfl:     multivitamin-minerals-lutein (MULTIVITAMIN 50 PLUS) Tab, , Disp: , Rfl:     NURTEC 75 mg odt, SMARTSI Tablet(s) Sublingual Every Other Day, Disp: , Rfl:     omega 3-dha-epa-fish oil (FISH OIL) 100-160-1,000 mg  "Cap, , Disp: , Rfl:     tadalafiL (CIALIS) 5 MG tablet, Take 5 mg by mouth., Disp: , Rfl:     tamsulosin (FLOMAX) 0.4 mg Cap, Take 0.4 mg by mouth once daily., Disp: , Rfl:     testosterone 100 mg pellet, , Disp: , Rfl:     traZODone (DESYREL) 150 MG tablet, Take 150 mg by mouth every evening., Disp: , Rfl:     WEGOVY 0.5 mg/0.5 mL PnIj, Inject into the skin., Disp: , Rfl:     Review of patient's allergies indicates:   Allergen Reactions    Sumatriptan Anaphylaxis    [id number] antimigraine agents        Social History     Tobacco Use    Smoking status: Never    Smokeless tobacco: Never   Substance Use Topics    Alcohol use: Yes     Alcohol/week: 2.0 standard drinks of alcohol     Types: 2 Shots of liquor per week     Occupation: [employer]  currently living in [place], [place]. He previously resided in [place], [place].  When he lived in [place], [place], the patient was an  specializing in malpractice cases.      Family History   Problem Relation Age of Onset    No Known Problems Mother     No Known Problems Father        ROS:  Constitutional:  Negative for chills and fever.   HENT:  Negative for congestion and sore throat.    Eyes:  Negative for blurred vision and double vision.   Respiratory:  Negative for cough and shortness of breath.    Cardiovascular:  Negative for chest pain and palpitations.   Gastrointestinal:  Negative for constipation, diarrhea, nausea and vomiting.   Musculoskeletal:  Positive for back pain, Negative for neck pain.   Neurological:  Positive for sensory change, Negative for focal weakness and headaches.   Endo/Heme/Allergies:  Does not bruise/bleed easily.   Psychiatric/Behavioral:  Negative for depression and anxiety.      OBJECTIVE:     EXAMINATION:  /73   Pulse 71   Resp 16   Ht 5' 11" (1.803 m)   Wt 113.3 kg (249 lb 12.8 oz)   BMI 34.84 kg/m²   Body Habitus: Significantly Obese    Physical Exam:  Constitutional: The patient is well-developed and cooperative, sitting " comfortably in a chair  Extremities: Edema in b LE, palpable DP pulses bilaterally     Mental Status:   Oriented to person, place, and time  Normal speech     Motor:  Muscle bulk: normal in all extremities  Tone: normal in all extremities     Upper extremities:  Deltoid: right 5/5; left 5/5  Biceps: right 5/5; left 5/5  Triceps: right 5/5; left 5/5  Wrist extensors: right 5/5; left 5/5  Wrist flexors: right 5/5; left 5/5  : right 5/5; left 5/5  Interosseous muscles: right 5/5; left 5/5     Lower extremities:  Hip flexors: right 5/5; left 5/5  Knee extensors: right 5/5; left 5/5  Knee flexors: right 5/5; left 5/5  Foot dorsiflexors: right 5/5; left 5/5  Foot plantar flexors: right 5/5; left 5/5  Extensor hallucis longus: right 5/5; left 5/5     Sensation:  Normal to light touch x 4 extremities, except for decreased sensation to light touch in bilateral medial thighs and bilateral knees to feet, especially in the bilateral great toes     Reflexes:  Cruzs sign: right negative; left negative  Babinski: right downgoing; left downgoing  Clonus: right negative; left negative       Musculoskeletal:     Gait: minimal limping    Straight leg test: right negative; left negative     Cervical: No pain with palpation, R transverse anterior neck scar  Upper back: No pain with palpation  Lower back: Minimal pain with palpation, well healed midline scar        DIAGNOSTICS REVIEW OF IMAGING, LAB & OTHER STUDIES:  I have personally reviewed and evaluated the following reports as well as radiographic studies:    Lumbar spine x-rays, 08/25/2023- there is minimal retrolisthesis of L3 on L4 and Grade I anterior spondylolisthesis of L4 on L5.  Postoperative changes s/p a L5-S1 ALIF with hardware in place.  There is movement at L4-5 with anterolisthesis measuring 6 mm in the neutral view, 9 mm with flexion, and 5 mm with extension.      CT lumbar spine without contrast, 08/25/2023- Grade I retrolisthesis L3 on L4.  There is Grade I  anterolisthesis L4 on L5.  Postoperative changes s/p ALIF with hardware in place.     MRI lumbar spine without gadolinium, 05/09/2023- Postoperative changes s/p a L5-S1 ALIF.  At L4-5, there is a left disc herniation with severe left neuroforaminal narrowing.      ASSESSMENT:  Mr. De La Vega is a 68 y.o. male who has a past medical history significant for hypertension, aortic valve replacement, and migraine headaches.  He is s/p 3 cervical spine surgeries 30 years ago in [place], [place] involving anterior fusions from C3-7.  The patient has also had 3 lumbar surgeries in the past including a L5-S1 ALIF (Globus) on 09/05/2014 by Dr. Howell.   In addition, he is s/p bilateral L4-5 laminotomies on 03/06/2020 with a SI joint fusion by Dr. Calix.  Mr. De La Vega presents as a follow up patient in the neurosurgery clinic for chronic low back pain with radiation into his right-greater-than-left lateral legs with walking.  These symptoms have progressed in the last year.  He has a normal motor exam with chronic numbness in his bilateral medial thighs and bilateral lower extremities below the knees distally to his feet with significant numbness in his bilateral great toes. There are no signs of myelopathy.      I reviewed pertinent imaging studies with the patient.  Lumbar spine x-rays demonstrate minimal retrolisthesis of L3 on L4 and Grade I anterior spondylolisthesis of L4 on L5.  Postoperative changes s/p a L5-S1 ALIF with hardware in place.  There is movement at L4-5 with anterolisthesis measuring 6 mm in the neutral view, 9 mm with flexion, and 5 mm with extension.  A MRI lumbar spine without gadolinium demonstrates postoperative changes s/p a L5-S1 ALIF.  At L4-5, there is a left disc herniation with mild stenosis and severe left neuroforaminal narrowing.    PLAN:    Encounter Diagnoses   Name Primary?    Spondylolisthesis of lumbar region Yes    Lumbar spine instability     Lumbar herniated disc     Lumbar stenosis  with neurogenic claudication     Chronic bilateral low back pain with bilateral sciatica      No orders of the defined types were placed in this encounter.       1.  I discussed with Mr. De La Vega that although his low back pain and bilateral leg pain have partially improved with optimization of medical management,  he is symptomatic from L4-5 anterior spondylolisthesis with instability as well as stenosis.  This L4-5 level is adjacent to his previous L5-S1 anterior fusion.  The patient is a candidate for surgery, specifically a redo lumbar surgery involving left L4-5 diskectomy, L4-5 laminectomy, and posterolateral fusion at L3-4, L4-5.  I reviewed the risks, benefits, and alternatives of this surgery. Mr. De La Vega agrees to proceed.  All questions were answered to his satisfaction.     2.  I have previously requested the neurosurgery office staff to contact his cardiologist Dr. Amador Hulrey in [place], [place].  Mr. De La Vega will need preoperative cardiac clearance as well as permission for him to discontinue taking aspirin 10 days preoperatively.  Arlene will be following up on this preoperative cardiac clearance request.      3.  In addition, the patient will need to discontinue taking Mobic, nutritional supplements, and all other NSAIDS 10 days preoperatively.    4.  The patient's surgery involving a left L4-5 diskectomy, L4-5 laminectomy, and posterolateral fusion at L3-4, L4-5 is being scheduled for Wednesday, 11/15/2023 at 7:30 a.m, provided his preoperative cardiac clearance has been completed.     5.  Arrangements are being made for him to follow up in the neurosurgery clinic with JERMAN Hinkle for a preoperative evaluation.    6.  Mr. De La Vega is scheduled to follow up with pain management specialist Dr. Mcgee on 10/23/2023.    7.  The patient's BMI is elevated at 34.84.  He is currently undergoing weekly IM injections of Wegovy for weight loss. We discussed weight loss goals with  diet and exercise.  Maintaining a healthy weight and strengthening core muscles are important factors for reducing pain along the spine.          The risks, benefits, and alternatives to surgery were discussed with the patient.    Complications of a Posterior Thoraco-Lumbar Fusion may include:  Failure to improve symptoms and/or increased or persistent pain; Recurrence, continuation, or worsening of the condition that required the operation; Need for further surgical intervention or treatment; Neurological injury, which may include spinal cord or nerve root injury, paralysis (which involves the inability to move arms and/or legs), clumsiness, loss of sympathetic response, loss of sensation, and loss of bowel, bladder, and sexual function; Delayed or immediate spinal instability; Failure of hardware; Misplaced screw; Pedicle fracture; Failure to fuse (increased risk with nicotine use); Theoretical risk of disease transmission from allograft material; Cerebral spinal fluid leak; Meningitis; Injury to abdominal contents- great vessels, ureters, bowel, kidneys; Damage to major blood vessels, nerves, and surrounding anatomical structures; Scarring; Blindness; and Positioning problems such as neuropathy or compartment syndrome    Complications of any surgery may include:  Adverse reaction to anesthesia; Bleeding; Transfusion of blood products, which carries a risk of infection or reaction; Infection, which requires treatment with antibiotics by mouth or intravenously, or even further surgeries; Urinary tract infection; Heart attack, stroke, pneumonia, and DVT/PE (blood clot in the legs or pelvis that can dislodge and go to the lungs); Other unforeseen complications; Coma; and Death.    Benefits of surgery include:  Possible improvement in buttocks and leg pain, numbness, and/or weakness; and possible  improvement in back pain.    Alternatives to the procedure include:  Physical therapy, chiropractic care, acupuncture,  medical therapy, and pain management       POSTERIOR THORACO-LUMBAR/ LUMBAR FUSION  DISCHARGE INSTRUCTIONS      1.  Wear your TLSO Brace when sitting upright and when you are out of bed.    Your brace will likely be discontinued after 3 months.      2.   Keep your incision clean and dry.    Your sutures or staples will be removed at your first postoperative follow-up appointment in approximately 14 days.   Place clean gauze and tape over your wound daily until your sutures or staples are removed.  Wait at least 72 hours from the time of your surgery to take a shower.  After showering, pat your incision dry and replace the wound dressing.  Do not immerse your incision in water for 4-6 weeks (e.g. bath tub, hot tub, swimming pool).      3.  Activity restrictions:  No lifting greater than 15 pounds for 3 months.  No bending, stooping, or twisting.  Avoid sitting in one position for over 30 minutes at a time.  No impact exercise or contact sports for at least 3 months.  No driving for at least 2 weeks.  To resume driving short distances (<30 minutes), you must be off of your narcotic medications and be able to comfortably brake suddenly, should the need arise.    Get up and walk.      4.  Contact your Neurosurgeon if the following occurs:  Signs and symptoms of infection, including fever above 101.5 degrees Fahrenheit and/or chills.  Redness, swelling, warmth, or drainage from the incision.  Any lasting changes in sensation, numbness, and/or tingling.  Increased weakness or increased pain.  Swelling of the foot and/or lower leg with calf pain.    Neurosurgery has office hours Monday through Friday, 8:00 AM to 4:00 PM except for holidays. There is an answering service available during non-office hours, with 24 hours neurosurgery coverage.  Report to the Emergency Department if you need immediate medical assistance.      Because you have had a fusion, you are not to take steroidal medications or non-steroidal  anti-inflammatory (NSAID) medications such as Motrin/ Ibuprofen or Naprosyn/ Naproxen unless agreed upon with your neurosurgeon.      Please contact Dr. Esipnoza's office for any questions or concerns.  Typically, your first follow-up appointment after a posterior thoraco-lumbar/ lumbar fusion surgery is approximately 14 days from the date of your operation.  At this time, sutures or staples will be removed.  For your second postoperative appointment in 4-6 weeks, an x-ray of your lumbar spine will be arranged in Radiology before your neurosurgery appointment.        This note will be sent to the patient's referring provider No ref. provider found and primary care provider Jose Francisco Hurley MD.           Lexie Espinoza MD  Neurosurgeon

## 2023-10-10 NOTE — PATIENT INSTRUCTIONS
Mr. De La Vega is a 68 y.o. male who has a past medical history significant for hypertension, aortic valve replacement, and migraine headaches.  He is s/p 3 cervical spine surgeries 30 years ago in [place], [place] involving anterior fusions from C3-7.  The patient has also had 3 lumbar surgeries in the past including a L5-S1 ALIF (Gl[facility]) on 09/05/2014 by Dr. Howell.   In addition, he is s/p bilateral L4-5 laminotomies on 03/06/2020 with a SI joint fusion by Dr. Calix.  Mr. De La Vega presents as a follow up patient in the neurosurgery clinic for chronic low back pain with radiation into his right-greater-than-left lateral legs with walking.  These symptoms have progressed in the last year.  He has a normal motor exam with chronic numbness in his bilateral medial thighs and bilateral lower extremities below the knees distally to his feet with significant numbness in his bilateral great toes. There are no signs of myelopathy.      I reviewed pertinent imaging studies with the patient.  Lumbar spine x-rays demonstrate minimal retrolisthesis of L3 on L4 and Grade I anterior spondylolisthesis of L4 on L5.  Postoperative changes s/p a L5-S1 ALIF with hardware in place.  There is movement at L4-5 with anterolisthesis measuring 6 mm in the neutral view, 9 mm with flexion, and 5 mm with extension.  A MRI lumbar spine without gadolinium demonstrates postoperative changes s/p a L5-S1 ALIF.  At L4-5, there is a left disc herniation with mild stenosis and severe left neuroforaminal narrowing.    PLAN:    Encounter Diagnoses   Name Primary?    Spondylolisthesis of lumbar region Yes    Lumbar spine instability     Lumbar herniated disc     Lumbar stenosis with neurogenic claudication     Chronic bilateral low back pain with bilateral sciatica      No orders of the defined types were placed in this encounter.       1.  I discussed with Mr. De La Vega that although his low back pain and bilateral leg pain have partially improved  with optimization of medical management,  he is symptomatic from L4-5 anterior spondylolisthesis with instability as well as stenosis.  This L4-5 level is adjacent to his previous L5-S1 anterior fusion.  The patient is a candidate for surgery, specifically a redo lumbar surgery involving left L4-5 diskectomy, L4-5 laminectomy, and posterolateral fusion at L3-4, L4-5.  I reviewed the risks, benefits, and alternatives of this surgery. Mr. De La Vega agrees to proceed.  All questions were answered to his satisfaction.     2.  I have previously requested the neurosurgery office staff to contact his cardiologist Dr. Amador Hurley in [place], [place].  Mr. De La Vega will need preoperative cardiac clearance as well as permission for him to discontinue taking aspirin 10 days preoperatively.  Arlene will be following up on this preoperative cardiac clearance request.      3.  In addition, the patient will need to discontinue taking Mobic, nutritional supplements, and all other NSAIDS 10 days preoperatively.    4.  The patient's surgery involving a left L4-5 diskectomy, L4-5 laminectomy, and posterolateral fusion at L3-4, L4-5 is being scheduled for Wednesday, 11/15/2023 at 7:30 a.m, provided his preoperative cardiac clearance has been completed.     5.  Arrangements are being made for him to follow up in the neurosurgery clinic with JERMAN Hinkle for a preoperative evaluation.    6.  Mr. De La Vega is scheduled to follow up with pain management specialist Dr. Mcgee on 10/23/2023.    7.  The patient's BMI is elevated at 34.84.  He is currently undergoing weekly IM injections of Wegovy for weight loss. We discussed weight loss goals with diet and exercise.  Maintaining a healthy weight and strengthening core muscles are important factors for reducing pain along the spine.          The risks, benefits, and alternatives to surgery were discussed with the patient.    Complications of a Posterior Thoraco-Lumbar Fusion  may include:  Failure to improve symptoms and/or increased or persistent pain; Recurrence, continuation, or worsening of the condition that required the operation; Need for further surgical intervention or treatment; Neurological injury, which may include spinal cord or nerve root injury, paralysis (which involves the inability to move arms and/or legs), clumsiness, loss of sympathetic response, loss of sensation, and loss of bowel, bladder, and sexual function; Delayed or immediate spinal instability; Failure of hardware; Misplaced screw; Pedicle fracture; Failure to fuse (increased risk with nicotine use); Theoretical risk of disease transmission from allograft material; Cerebral spinal fluid leak; Meningitis; Injury to abdominal contents- great vessels, ureters, bowel, kidneys; Damage to major blood vessels, nerves, and surrounding anatomical structures; Scarring; Blindness; and Positioning problems such as neuropathy or compartment syndrome    Complications of any surgery may include:  Adverse reaction to anesthesia; Bleeding; Transfusion of blood products, which carries a risk of infection or reaction; Infection, which requires treatment with antibiotics by mouth or intravenously, or even further surgeries; Urinary tract infection; Heart attack, stroke, pneumonia, and DVT/PE (blood clot in the legs or pelvis that can dislodge and go to the lungs); Other unforeseen complications; Coma; and Death.    Benefits of surgery include:  Possible improvement in buttocks and leg pain, numbness, and/or weakness; and possible  improvement in back pain.    Alternatives to the procedure include:  Physical therapy, chiropractic care, acupuncture, medical therapy, and pain management       POSTERIOR THORACO-LUMBAR/ LUMBAR FUSION  DISCHARGE INSTRUCTIONS      1.  Wear your TLSO Brace when sitting upright and when you are out of bed.    Your brace will likely be discontinued after 3 months.      2.   Keep your incision clean and  dry.    Your sutures or staples will be removed at your first postoperative follow-up appointment in approximately 14 days.   Place clean gauze and tape over your wound daily until your sutures or staples are removed.  Wait at least 72 hours from the time of your surgery to take a shower.  After showering, pat your incision dry and replace the wound dressing.  Do not immerse your incision in water for 4-6 weeks (e.g. bath tub, hot tub, swimming pool).      3.  Activity restrictions:  No lifting greater than 15 pounds for 3 months.  No bending, stooping, or twisting.  Avoid sitting in one position for over 30 minutes at a time.  No impact exercise or contact sports for at least 3 months.  No driving for at least 2 weeks.  To resume driving short distances (<30 minutes), you must be off of your narcotic medications and be able to comfortably brake suddenly, should the need arise.    Get up and walk.      4.  Contact your Neurosurgeon if the following occurs:  Signs and symptoms of infection, including fever above 101.5 degrees Fahrenheit and/or chills.  Redness, swelling, warmth, or drainage from the incision.  Any lasting changes in sensation, numbness, and/or tingling.  Increased weakness or increased pain.  Swelling of the foot and/or lower leg with calf pain.    Neurosurgery has office hours Monday through Friday, 8:00 AM to 4:00 PM except for holidays. There is an answering service available during non-office hours, with 24 hours neurosurgery coverage.  Report to the Emergency Department if you need immediate medical assistance.      Because you have had a fusion, you are not to take steroidal medications or non-steroidal anti-inflammatory (NSAID) medications such as Motrin/ Ibuprofen or Naprosyn/ Naproxen unless agreed upon with your neurosurgeon.      Please contact Dr. Espinoza's office for any questions or concerns.  Typically, your first follow-up appointment after a posterior thoraco-lumbar/ lumbar fusion  surgery is approximately 14 days from the date of your operation.  At this time, sutures or staples will be removed.  For your second postoperative appointment in 4-6 weeks, an x-ray of your lumbar spine will be arranged in Radiology before your neurosurgery appointment.        This note will be sent to the patient's referring provider No ref. provider found and primary care provider Jose Francisco Hurley MD.

## 2023-10-11 ENCOUNTER — PATIENT MESSAGE (OUTPATIENT)
Dept: PAIN MEDICINE | Facility: CLINIC | Age: 68
End: 2023-10-11
Payer: COMMERCIAL

## 2023-10-13 ENCOUNTER — PATIENT MESSAGE (OUTPATIENT)
Dept: NEUROSURGERY | Facility: CLINIC | Age: 68
End: 2023-10-13
Payer: COMMERCIAL

## 2023-10-26 ENCOUNTER — PATIENT MESSAGE (OUTPATIENT)
Dept: NEUROSURGERY | Facility: CLINIC | Age: 68
End: 2023-10-26
Payer: COMMERCIAL

## 2023-10-31 ENCOUNTER — OFFICE VISIT (OUTPATIENT)
Dept: NEUROSURGERY | Facility: CLINIC | Age: 68
End: 2023-10-31
Payer: COMMERCIAL

## 2023-10-31 ENCOUNTER — HOSPITAL ENCOUNTER (OUTPATIENT)
Dept: RADIOLOGY | Facility: HOSPITAL | Age: 68
Discharge: HOME | End: 2023-10-31
Attending: PHYSICIAN ASSISTANT
Payer: COMMERCIAL

## 2023-10-31 VITALS
TEMPERATURE: 98 F | HEART RATE: 71 BPM | BODY MASS INDEX: 35.42 KG/M2 | SYSTOLIC BLOOD PRESSURE: 115 MMHG | RESPIRATION RATE: 16 BRPM | HEIGHT: 71 IN | WEIGHT: 253 LBS | DIASTOLIC BLOOD PRESSURE: 74 MMHG

## 2023-10-31 DIAGNOSIS — M48.062 LUMBAR STENOSIS WITH NEUROGENIC CLAUDICATION: ICD-10-CM

## 2023-10-31 DIAGNOSIS — Z01.818 PREOPERATIVE TESTING: ICD-10-CM

## 2023-10-31 DIAGNOSIS — M43.16 SPONDYLOLISTHESIS OF LUMBAR REGION: ICD-10-CM

## 2023-10-31 DIAGNOSIS — M51.26 LUMBAR HERNIATED DISC: ICD-10-CM

## 2023-10-31 DIAGNOSIS — G47.30 SLEEP APNEA, UNSPECIFIED TYPE: ICD-10-CM

## 2023-10-31 DIAGNOSIS — I10 PRIMARY HYPERTENSION: ICD-10-CM

## 2023-10-31 DIAGNOSIS — D68.9 COAGULATION DEFECT: ICD-10-CM

## 2023-10-31 DIAGNOSIS — R01.1 HEART MURMUR: ICD-10-CM

## 2023-10-31 DIAGNOSIS — M43.16 SPONDYLOLISTHESIS OF LUMBAR REGION: Primary | ICD-10-CM

## 2023-10-31 DIAGNOSIS — Z98.890 POST-OPERATIVE STATE: Primary | ICD-10-CM

## 2023-10-31 PROCEDURE — 1160F PR REVIEW ALL MEDS BY PRESCRIBER/CLIN PHARMACIST DOCUMENTED: ICD-10-PCS | Mod: CPTII,,, | Performed by: PHYSICIAN ASSISTANT

## 2023-10-31 PROCEDURE — 3008F BODY MASS INDEX DOCD: CPT | Mod: CPTII,,, | Performed by: PHYSICIAN ASSISTANT

## 2023-10-31 PROCEDURE — 3078F PR MOST RECENT DIASTOLIC BLOOD PRESSURE < 80 MM HG: ICD-10-PCS | Mod: CPTII,,, | Performed by: PHYSICIAN ASSISTANT

## 2023-10-31 PROCEDURE — 1125F AMNT PAIN NOTED PAIN PRSNT: CPT | Mod: CPTII,,, | Performed by: PHYSICIAN ASSISTANT

## 2023-10-31 PROCEDURE — 3288F FALL RISK ASSESSMENT DOCD: CPT | Mod: CPTII,,, | Performed by: PHYSICIAN ASSISTANT

## 2023-10-31 PROCEDURE — 71046 X-RAY EXAM CHEST 2 VIEWS: CPT | Mod: TC

## 2023-10-31 PROCEDURE — 3288F PR FALLS RISK ASSESSMENT DOCUMENTED: ICD-10-PCS | Mod: CPTII,,, | Performed by: PHYSICIAN ASSISTANT

## 2023-10-31 PROCEDURE — 1159F MED LIST DOCD IN RCRD: CPT | Mod: CPTII,,, | Performed by: PHYSICIAN ASSISTANT

## 2023-10-31 PROCEDURE — 99213 PR OFFICE/OUTPT VISIT, EST, LEVL III, 20-29 MIN: ICD-10-PCS | Mod: ,,, | Performed by: PHYSICIAN ASSISTANT

## 2023-10-31 PROCEDURE — 3074F SYST BP LT 130 MM HG: CPT | Mod: CPTII,,, | Performed by: PHYSICIAN ASSISTANT

## 2023-10-31 PROCEDURE — 3078F DIAST BP <80 MM HG: CPT | Mod: CPTII,,, | Performed by: PHYSICIAN ASSISTANT

## 2023-10-31 PROCEDURE — 99213 OFFICE O/P EST LOW 20 MIN: CPT | Mod: ,,, | Performed by: PHYSICIAN ASSISTANT

## 2023-10-31 PROCEDURE — 1101F PT FALLS ASSESS-DOCD LE1/YR: CPT | Mod: CPTII,,, | Performed by: PHYSICIAN ASSISTANT

## 2023-10-31 PROCEDURE — 3008F PR BODY MASS INDEX (BMI) DOCUMENTED: ICD-10-PCS | Mod: CPTII,,, | Performed by: PHYSICIAN ASSISTANT

## 2023-10-31 PROCEDURE — 1125F PR PAIN SEVERITY QUANTIFIED, PAIN PRESENT: ICD-10-PCS | Mod: CPTII,,, | Performed by: PHYSICIAN ASSISTANT

## 2023-10-31 PROCEDURE — 1160F RVW MEDS BY RX/DR IN RCRD: CPT | Mod: CPTII,,, | Performed by: PHYSICIAN ASSISTANT

## 2023-10-31 PROCEDURE — 3074F PR MOST RECENT SYSTOLIC BLOOD PRESSURE < 130 MM HG: ICD-10-PCS | Mod: CPTII,,, | Performed by: PHYSICIAN ASSISTANT

## 2023-10-31 PROCEDURE — 1101F PR PT FALLS ASSESS DOC 0-1 FALLS W/OUT INJ PAST YR: ICD-10-PCS | Mod: CPTII,,, | Performed by: PHYSICIAN ASSISTANT

## 2023-10-31 PROCEDURE — 1159F PR MEDICATION LIST DOCUMENTED IN MEDICAL RECORD: ICD-10-PCS | Mod: CPTII,,, | Performed by: PHYSICIAN ASSISTANT

## 2023-10-31 RX ORDER — CYCLOBENZAPRINE HCL 10 MG
10 TABLET ORAL 3 TIMES DAILY PRN
Qty: 60 TABLET | Refills: 2 | Status: ON HOLD | OUTPATIENT
Start: 2023-10-31 | End: 2023-11-17 | Stop reason: SDUPTHER

## 2023-10-31 NOTE — PROGRESS NOTES
[facility]  History & Physical  Neurosurgery      Jose De La Vega   [id number]   1955     SUBJECTIVE:     CHIEF COMPLAINT:  Lower back pain      HPI:  Jose De La Vega is a 68 y.o. male who presents for neurosurgical evaluation.  The patient complains of lower back pain with pain radiating to the right-greater-than-left lateral hip and lateral thigh.  There is decreased sensation along the anterior ankle and dorsal feet.  He also notes cramping sensations that have started recently at the proximal calves bilaterally.  He feels a heaviness in his legs intermittently.  When he feels this heaviness in his legs, his right foot catches on the floor.  He has undergone a course of physical therapy which was of no significant benefit.  His symptoms are greatly affecting his activities at home as well as with work.  Being that his pain is increased with standing, Sundays or quite a struggle for him.    His spine history is significant for having undergone L5-S1 ALIF with Dr. Howell on 09/05/2014.  On 03/06/2020, the patient underwent bilateral L4-5 decompression and right SI joint fusion with Dr. Calix.  After this surgery, he was good for about 9 months.  Thereafter, he experienced recurrent pain that has progressively worsened.  The pain and dysfunction has been more of a problem since this summer.  He denies disturbances in bladder function.  He does have intermittent issues with constipation.  There is no difficulties with his balance.      Past Medical History:   Diagnosis Date    Arthritis     Essential (primary) hypertension     Heart murmur     High cholesterol     Lumbar pain     Migraine     Seasonal allergies     Sleep apnea        Past Surgical History:   Procedure Laterality Date    AORTIC VALVE REPLACEMENT      CERVICAL SPINE SURGERY      C4-C7    KNEE SURGERY Left     LUMBAR SPINE SURGERY      2015 & 2020    TONSILLECTOMY      TRANSFORAMINAL EPIDURAL INJECTION OF STEROID Right  10/5/2023    Procedure: INJECTION, STEROID, EPIDURAL, TRANSFORAMINAL APPROACH Right L4 L5;  Surgeon: Florence Mcgee MD;  Location: [facility] OR;  Service: Pain Management;  Laterality: Right;  Rt L4 & L5    VASECTOMY      WRIST SURGERY Right        Family History   Problem Relation Age of Onset    No Known Problems Mother     No Known Problems Father        Social History     Socioeconomic History    Marital status:    Tobacco Use    Smoking status: Never    Smokeless tobacco: Never   Substance and Sexual Activity    Alcohol use: Yes     Alcohol/week: 2.0 standard drinks of alcohol     Types: 2 Shots of liquor per week    Drug use: Never       Current Outpatient Medications   Medication Instructions    aspirin (ECOTRIN) 325 mg, Oral, Daily    atorvastatin (LIPITOR) 40 mg, Oral, Daily    chlorpheniramine (CHLOR-TRIMETON) 4 mg, Oral, Daily    co-enzyme Q-10 50 mg, Oral    cyclobenzaprine (FLEXERIL) 10 mg, Oral, 3 times daily PRN    ezetimibe (ZETIA) 10 mg, Oral, Daily    fluticasone propionate (FLONASE) 50 mcg/actuation nasal spray 1 spray, Each Nostril, 2 times daily    furosemide (LASIX) 40 mg, Oral, Daily    HYDROcodone-acetaminophen (NORCO) 5-325 mg per tablet 1-2 tablets, Oral, Every 6 hours PRN    INV potassium chloride SA (K-DUR,KLOR-CON) 20 mEq Tab No dose, route, or frequency recorded.    ipratropium (ATROVENT) 42 mcg (0.06 %) nasal spray 2 sprays, Nasal, 2 times daily    levocetirizine (XYZAL) 5 mg, Oral, Daily    meloxicam (MOBIC) 7.5 mg, Oral, Daily    metoprolol succinate (TOPROL-XL) 25 mg, Oral, Daily    multivitamin-minerals-lutein (MULTIVITAMIN 50 PLUS) Tab No dose, route, or frequency recorded.    NURTEC 75 mg odt SMARTSI Tablet(s) Sublingual Every Other Day    omega 3-dha-epa-fish oil (FISH OIL) 100-160-1,000 mg Cap No dose, route, or frequency recorded.    tadalafiL (CIALIS) 5 mg, Oral    tamsulosin (FLOMAX) 0.4 mg, Oral, Daily    testosterone 100 mg pellet No dose, route, or frequency  "recorded.    traZODone (DESYREL) 75 mg, Oral, Nightly    WEGOVY 0.5 mg/0.5 mL PnIj Subcutaneous       Review of patient's allergies indicates:  No Known Allergies       Review of Systems   Constitutional:  Negative for chills and fever.   HENT:  Negative for nosebleeds and sore throat.    Eyes:  Negative for pain and visual disturbance.   Respiratory:  Negative for cough, chest tightness and shortness of breath.    Cardiovascular:  Negative for chest pain.   Gastrointestinal:  Negative for diarrhea, nausea and vomiting.   Genitourinary:  Negative for difficulty urinating, dysuria and hematuria.   Musculoskeletal:  Positive for back pain and gait problem. Negative for myalgias.   Skin:  Negative for rash.   Neurological:  Positive for weakness and numbness. Negative for dizziness, facial asymmetry and headaches.   Psychiatric/Behavioral:  Negative for confusion and sleep disturbance. The patient is not nervous/anxious.        OBJECTIVE:       Visit Vitals  /74 (BP Location: Right arm, Patient Position: Sitting)   Pulse 71   Temp 97.7 °F (36.5 °C) (Oral)   Resp 16   Ht 5' 11" (1.803 m)   Wt 114.8 kg (253 lb)   BMI 35.29 kg/m²        General:  Pleasant, Well-nourished, Well-groomed.    CV:  Neck is supple.  There are no carotid bruits.  Heart has regular rate and rhythm.    Lungs:  Lungs are clear to auscultation bilaterally with non-labored respirations.    Abdomen:  Soft, non-tender, non-distended    Motor:  Muscle bulk: normal in all extremities  Tone: normal in all extremities     Upper extremities:  Deltoid: right 5/5; left 5/5  Biceps: right 5/5; left 5/5  Triceps: right 5/5; left 5/5  Wrist extensors: right 5/5; left 5/5  Wrist flexors: right 5/5; left 5/5  : right 5/5; left 5/5  Interosseous muscles: right 5/5; left 5/5     Lower extremities:  Hip flexors: right 5/5; left 5/5  Knee extensors: right 5/5; left 5/5  Knee flexors: right 5/5; left 5/5  Foot dorsiflexors: right 5/5; left 5/5  Foot plantar " flexors: right 5/5; left 5/5  Extensor hallucis longus: right 5/5; left 5/5     Sensation:  Normal to light touch x 4 extremities, except for decreased sensation to light touch in bilateral medial thighs and bilateral knees to feet, especially in the bilateral great toes     Reflexes:  Cruzs sign: right negative; left negative  Babinski: right downgoing; left downgoing  Clonus: right negative; left negative        Musculoskeletal:     Gait: minimal limping     Straight leg test: right negative; left negative     Cervical: No pain with palpation, R transverse anterior neck scar  Upper back: No pain with palpation  Lower back: Minimal pain with palpation, well healed midline scar      Imaging:  All pertinent neuroimaging independently reviewed. Discussed these findings in detail with the patient.      ASSESSMENT:     1. Spondylolisthesis of lumbar region  EKG 12-lead    Protime-INR    APTT    CBC Auto Differential    Comprehensive Metabolic Panel    X-Ray Chest PA And Lateral    Urinalysis    cyclobenzaprine (FLEXERIL) 10 MG tablet      2. Lumbar herniated disc  EKG 12-lead    Protime-INR    APTT    CBC Auto Differential    Comprehensive Metabolic Panel    X-Ray Chest PA And Lateral    Urinalysis      3. Lumbar stenosis with neurogenic claudication  EKG 12-lead    Protime-INR    APTT    CBC Auto Differential    Comprehensive Metabolic Panel    X-Ray Chest PA And Lateral    Urinalysis      4. Preoperative testing  EKG 12-lead    Protime-INR    APTT    CBC Auto Differential    Comprehensive Metabolic Panel    X-Ray Chest PA And Lateral    Urinalysis      5. Primary hypertension  EKG 12-lead    X-Ray Chest PA And Lateral      6. Sleep apnea, unspecified type  X-Ray Chest PA And Lateral      7. Heart murmur  EKG 12-lead      8. Coagulation defect  Protime-INR    APTT        PLAN:     Options were discussed again at length with the patient.  Risks, benefits, and possible complications were discussed.  All of his  questions were answered.  Due to the failure of conservative measures in providing satisfactory relief, surgery has been elected.  Plan is for L4-5 decompression with L3-4 and L4-5 posterolateral and instrumented fusion.      A total of 25 minutes was spent face-to-face with the patient during this encounter.  Over half of that time was spent on counseling and coordination of care.       Kaleigh Saavedra PA-C      Disclaimer:  This note is prepared using voice recognition software and as such is likely to have errors despite attempts at proofreading.

## 2023-10-31 NOTE — H&P (VIEW-ONLY)
[facility]  History & Physical  Neurosurgery      Jose De La Vega   [id number]   1955     SUBJECTIVE:     CHIEF COMPLAINT:  Lower back pain      HPI:  Jose De La Vega is a 68 y.o. male who presents for neurosurgical evaluation.  The patient complains of lower back pain with pain radiating to the right-greater-than-left lateral hip and lateral thigh.  There is decreased sensation along the anterior ankle and dorsal feet.  He also notes cramping sensations that have started recently at the proximal calves bilaterally.  He feels a heaviness in his legs intermittently.  When he feels this heaviness in his legs, his right foot catches on the floor.  He has undergone a course of physical therapy which was of no significant benefit.  His symptoms are greatly affecting his activities at home as well as with work.  Being that his pain is increased with standing, Sundays or quite a struggle for him.    His spine history is significant for having undergone L5-S1 ALIF with Dr. Howell on 09/05/2014.  On 03/06/2020, the patient underwent bilateral L4-5 decompression and right SI joint fusion with Dr. Calix.  After this surgery, he was good for about 9 months.  Thereafter, he experienced recurrent pain that has progressively worsened.  The pain and dysfunction has been more of a problem since this summer.  He denies disturbances in bladder function.  He does have intermittent issues with constipation.  There is no difficulties with his balance.      Past Medical History:   Diagnosis Date    Arthritis     Essential (primary) hypertension     Heart murmur     High cholesterol     Lumbar pain     Migraine     Seasonal allergies     Sleep apnea        Past Surgical History:   Procedure Laterality Date    AORTIC VALVE REPLACEMENT      CERVICAL SPINE SURGERY      C4-C7    KNEE SURGERY Left     LUMBAR SPINE SURGERY      2015 & 2020    TONSILLECTOMY      TRANSFORAMINAL EPIDURAL INJECTION OF STEROID Right  10/5/2023    Procedure: INJECTION, STEROID, EPIDURAL, TRANSFORAMINAL APPROACH Right L4 L5;  Surgeon: Florence Mcgee MD;  Location: [facility] OR;  Service: Pain Management;  Laterality: Right;  Rt L4 & L5    VASECTOMY      WRIST SURGERY Right        Family History   Problem Relation Age of Onset    No Known Problems Mother     No Known Problems Father        Social History     Socioeconomic History    Marital status:    Tobacco Use    Smoking status: Never    Smokeless tobacco: Never   Substance and Sexual Activity    Alcohol use: Yes     Alcohol/week: 2.0 standard drinks of alcohol     Types: 2 Shots of liquor per week    Drug use: Never       Current Outpatient Medications   Medication Instructions    aspirin (ECOTRIN) 325 mg, Oral, Daily    atorvastatin (LIPITOR) 40 mg, Oral, Daily    chlorpheniramine (CHLOR-TRIMETON) 4 mg, Oral, Daily    co-enzyme Q-10 50 mg, Oral    cyclobenzaprine (FLEXERIL) 10 mg, Oral, 3 times daily PRN    ezetimibe (ZETIA) 10 mg, Oral, Daily    fluticasone propionate (FLONASE) 50 mcg/actuation nasal spray 1 spray, Each Nostril, 2 times daily    furosemide (LASIX) 40 mg, Oral, Daily    HYDROcodone-acetaminophen (NORCO) 5-325 mg per tablet 1-2 tablets, Oral, Every 6 hours PRN    INV potassium chloride SA (K-DUR,KLOR-CON) 20 mEq Tab No dose, route, or frequency recorded.    ipratropium (ATROVENT) 42 mcg (0.06 %) nasal spray 2 sprays, Nasal, 2 times daily    levocetirizine (XYZAL) 5 mg, Oral, Daily    meloxicam (MOBIC) 7.5 mg, Oral, Daily    metoprolol succinate (TOPROL-XL) 25 mg, Oral, Daily    multivitamin-minerals-lutein (MULTIVITAMIN 50 PLUS) Tab No dose, route, or frequency recorded.    NURTEC 75 mg odt SMARTSI Tablet(s) Sublingual Every Other Day    omega 3-dha-epa-fish oil (FISH OIL) 100-160-1,000 mg Cap No dose, route, or frequency recorded.    tadalafiL (CIALIS) 5 mg, Oral    tamsulosin (FLOMAX) 0.4 mg, Oral, Daily    testosterone 100 mg pellet No dose, route, or frequency  "recorded.    traZODone (DESYREL) 75 mg, Oral, Nightly    WEGOVY 0.5 mg/0.5 mL PnIj Subcutaneous       Review of patient's allergies indicates:  No Known Allergies       Review of Systems   Constitutional:  Negative for chills and fever.   HENT:  Negative for nosebleeds and sore throat.    Eyes:  Negative for pain and visual disturbance.   Respiratory:  Negative for cough, chest tightness and shortness of breath.    Cardiovascular:  Negative for chest pain.   Gastrointestinal:  Negative for diarrhea, nausea and vomiting.   Genitourinary:  Negative for difficulty urinating, dysuria and hematuria.   Musculoskeletal:  Positive for back pain and gait problem. Negative for myalgias.   Skin:  Negative for rash.   Neurological:  Positive for weakness and numbness. Negative for dizziness, facial asymmetry and headaches.   Psychiatric/Behavioral:  Negative for confusion and sleep disturbance. The patient is not nervous/anxious.        OBJECTIVE:       Visit Vitals  /74 (BP Location: Right arm, Patient Position: Sitting)   Pulse 71   Temp 97.7 °F (36.5 °C) (Oral)   Resp 16   Ht 5' 11" (1.803 m)   Wt 114.8 kg (253 lb)   BMI 35.29 kg/m²        General:  Pleasant, Well-nourished, Well-groomed.    CV:  Neck is supple.  There are no carotid bruits.  Heart has regular rate and rhythm.    Lungs:  Lungs are clear to auscultation bilaterally with non-labored respirations.    Abdomen:  Soft, non-tender, non-distended    Motor:  Muscle bulk: normal in all extremities  Tone: normal in all extremities     Upper extremities:  Deltoid: right 5/5; left 5/5  Biceps: right 5/5; left 5/5  Triceps: right 5/5; left 5/5  Wrist extensors: right 5/5; left 5/5  Wrist flexors: right 5/5; left 5/5  : right 5/5; left 5/5  Interosseous muscles: right 5/5; left 5/5     Lower extremities:  Hip flexors: right 5/5; left 5/5  Knee extensors: right 5/5; left 5/5  Knee flexors: right 5/5; left 5/5  Foot dorsiflexors: right 5/5; left 5/5  Foot plantar " flexors: right 5/5; left 5/5  Extensor hallucis longus: right 5/5; left 5/5     Sensation:  Normal to light touch x 4 extremities, except for decreased sensation to light touch in bilateral medial thighs and bilateral knees to feet, especially in the bilateral great toes     Reflexes:  Cruzs sign: right negative; left negative  Babinski: right downgoing; left downgoing  Clonus: right negative; left negative        Musculoskeletal:     Gait: minimal limping     Straight leg test: right negative; left negative     Cervical: No pain with palpation, R transverse anterior neck scar  Upper back: No pain with palpation  Lower back: Minimal pain with palpation, well healed midline scar      Imaging:  All pertinent neuroimaging independently reviewed. Discussed these findings in detail with the patient.      ASSESSMENT:     1. Spondylolisthesis of lumbar region  EKG 12-lead    Protime-INR    APTT    CBC Auto Differential    Comprehensive Metabolic Panel    X-Ray Chest PA And Lateral    Urinalysis    cyclobenzaprine (FLEXERIL) 10 MG tablet      2. Lumbar herniated disc  EKG 12-lead    Protime-INR    APTT    CBC Auto Differential    Comprehensive Metabolic Panel    X-Ray Chest PA And Lateral    Urinalysis      3. Lumbar stenosis with neurogenic claudication  EKG 12-lead    Protime-INR    APTT    CBC Auto Differential    Comprehensive Metabolic Panel    X-Ray Chest PA And Lateral    Urinalysis      4. Preoperative testing  EKG 12-lead    Protime-INR    APTT    CBC Auto Differential    Comprehensive Metabolic Panel    X-Ray Chest PA And Lateral    Urinalysis      5. Primary hypertension  EKG 12-lead    X-Ray Chest PA And Lateral      6. Sleep apnea, unspecified type  X-Ray Chest PA And Lateral      7. Heart murmur  EKG 12-lead      8. Coagulation defect  Protime-INR    APTT        PLAN:     Options were discussed again at length with the patient.  Risks, benefits, and possible complications were discussed.  All of his  questions were answered.  Due to the failure of conservative measures in providing satisfactory relief, surgery has been elected.  Plan is for L4-5 decompression with L3-4 and L4-5 posterolateral and instrumented fusion.      A total of 25 minutes was spent face-to-face with the patient during this encounter.  Over half of that time was spent on counseling and coordination of care.       Kaleigh Saavedra PA-C      Disclaimer:  This note is prepared using voice recognition software and as such is likely to have errors despite attempts at proofreading.

## 2023-10-31 NOTE — PATIENT INSTRUCTIONS
Stop aspirin, meloxicam, multivitamin, and fish oils.  The last dose is to be 11/3/2023.  Nothing to eat or drink the morning of surgery.  Take lasix and toprol the morning of surgery of surgery with a sip of water.    Resume asipirn, multivitamin and fish oils 10 days after surgery.  Ok to resume NSAIDs 6 weeks after surgery.

## 2023-11-09 ENCOUNTER — TELEPHONE (OUTPATIENT)
Dept: NEUROSURGERY | Facility: CLINIC | Age: 68
End: 2023-11-09
Payer: COMMERCIAL

## 2023-11-09 ENCOUNTER — ANESTHESIA EVENT (OUTPATIENT)
Dept: SURGERY | Facility: HOSPITAL | Age: 68
DRG: 460 | End: 2023-11-09
Payer: COMMERCIAL

## 2023-11-09 NOTE — TELEPHONE ENCOUNTER
----- Message from Arlene Waterman LPN sent at 10/31/2023  8:42 AM CDT -----  Regarding: CARDIAC CLEARANCE- JL  Faxed clearance request.   ----- Message -----  From: Arlene Waterman LPN  Sent: 10/10/2023  11:41 AM CDT  To: Arlene Waterman LPN  Subject: RE: needs cardiac clearance- JL                  Needs cardiac, not medical  cardiologist Dr. Amador Hurley in [place], [place].  Mr. De La Vega will need preoperative cardiac clearance as well as permission for him to discontinue taking aspirin 10 days preoperatively.  ----- Message -----  From: Arlene Waterman LPN  Sent: 10/10/2023  11:39 AM CDT  To: Arlene Waterman LPN  Subject: needs medical clearance- TRISTEN Hurley in [place]  On asa

## 2023-11-10 ENCOUNTER — LAB VISIT (OUTPATIENT)
Dept: LAB | Facility: HOSPITAL | Age: 68
End: 2023-11-10
Attending: PHYSICIAN ASSISTANT
Payer: COMMERCIAL

## 2023-11-10 DIAGNOSIS — Z01.818 PREOP TESTING: ICD-10-CM

## 2023-11-10 PROCEDURE — 93005 ELECTROCARDIOGRAM TRACING: CPT

## 2023-11-13 ENCOUNTER — TELEPHONE (OUTPATIENT)
Dept: NEUROSURGERY | Facility: CLINIC | Age: 68
End: 2023-11-13
Payer: COMMERCIAL

## 2023-11-13 ENCOUNTER — PATIENT MESSAGE (OUTPATIENT)
Dept: NEUROSURGERY | Facility: CLINIC | Age: 68
End: 2023-11-13
Payer: COMMERCIAL

## 2023-11-13 NOTE — TELEPHONE ENCOUNTER
I relayed the info to [facility] pres[facility]  that is working on this case.   I informed patient through portal.

## 2023-11-14 ENCOUNTER — TELEPHONE (OUTPATIENT)
Dept: NEUROSURGERY | Facility: CLINIC | Age: 68
End: 2023-11-14
Payer: COMMERCIAL

## 2023-11-14 DIAGNOSIS — M51.26 LUMBAR HERNIATED DISC: ICD-10-CM

## 2023-11-14 DIAGNOSIS — M43.16 SPONDYLOLISTHESIS OF LUMBAR REGION: ICD-10-CM

## 2023-11-14 DIAGNOSIS — M48.062 LUMBAR STENOSIS WITH NEUROGENIC CLAUDICATION: Primary | ICD-10-CM

## 2023-11-14 NOTE — TELEPHONE ENCOUNTER
Preop call complete. Questions answered. Arrival time of 7 am given & confirmed (changed from 5 am).

## 2023-11-14 NOTE — PRE-PROCEDURE INSTRUCTIONS
[facility]: Outpatient Surgery   Preprocedure Check-In Instructions       Your arrival time for your surgery or procedure is 5:00am.     We ask patients to arrive about 2 hours before surgery to allow for enough time to review your health history & medications, start your IV, complete any outstanding labwork or tests, and meet your Anesthesiologist.     You will arrive at [facility], [address] [place] [address]. Enter through the [place] entrance next to the Emergency Room, and come to the 6th floor to the Outpatient Surgery Department. If you need a wheelchair, please call [phone] for an attendant to meet you at the [place] entrance with a wheelchair.    Wait Times:  Due to inconsistent procedure completion times, an unexpected wait may occur.  The physicians would like you to be here in the event they run ahead of time.  We will keep you comfortable and informed while you are waiting.  We apologize in advance if this happens.    Visitory Policy:   You are allowed 2 adult visitors to be with you in the hospital. All hospital visitors should be in good current health. No small children.     What to Bring:   Please have your ID, insurance cards, and all home medication bottles with you at check in. Bring your CPAP machine if one is used at home.     Fasting:   Nothing to eat or drink after midnight the night before your procedure. This includes no ice, gum, hard candies, and/or tobacco products.   Follow your doctor's instructions for taking any medications on the morning of your procedure. If no instructions for taking medications were given, do not take any medications but bring your medications in their bottles to your procedure check in.     Follow your doctor's preoperative instructions regarding skin prep, bowel prep, bathing, or showering prior to your procedure. If any special soaps were provided to you, please use according to your doctor's instructions.  If no instructions were given from your doctor, take a good bath or shower with antibacterial soap the night before and the morning of your procedure. On the morning of procedure, wear loose, comfortable clothing. No lotions, makeup, perfumes, colognes, deodorant, or jewelry to your procedure. Removable items (glasses, contact lenses, dentures, retainers, hearing aids) need to be removed for your procedure. Bring your storage containers for these items if you wear them.     Artificial nails, body jewelry, eyelash extensions, and/or hair extensions with metal clips are not allowed during your surgery. If you currently wear any of these items, please arrange for them to be removed prior to your arrival to the hospital.     Outpatient or Same Day Surgeries:   Any patients receiving sedation/anesthesia are advised not to drive for 24 hours after their procedure. We do not allow patients to drive themselves home after discharge. If you are going home after your procedure, please have someone available to drive you home from the hospital.     You may call the Outpatient Surgery Department at [phone] with any questions or concerns. We are looking forward to meeting you and taking great care of you for your procedure. Thank you for choosing [facility] for your surgical needs.       Status: complete  Spoke with: patient  Call Time: [id number]

## 2023-11-15 ENCOUNTER — ANESTHESIA (OUTPATIENT)
Dept: SURGERY | Facility: HOSPITAL | Age: 68
DRG: 460 | End: 2023-11-15
Payer: COMMERCIAL

## 2023-11-15 ENCOUNTER — HOSPITAL ENCOUNTER (INPATIENT)
Facility: HOSPITAL | Age: 68
LOS: 4 days | Discharge: HOME | DRG: 460 | End: 2023-11-19
Attending: NEUROLOGICAL SURGERY | Admitting: NEUROLOGICAL SURGERY
Payer: COMMERCIAL

## 2023-11-15 DIAGNOSIS — M48.062 LUMBAR STENOSIS WITH NEUROGENIC CLAUDICATION: Primary | ICD-10-CM

## 2023-11-15 DIAGNOSIS — Z01.818 PREOP TESTING: ICD-10-CM

## 2023-11-15 DIAGNOSIS — M43.16 SPONDYLOLISTHESIS OF LUMBAR REGION: ICD-10-CM

## 2023-11-15 PROBLEM — M43.17 SPONDYLOLISTHESIS OF LUMBOSACRAL REGION: Status: ACTIVE | Noted: 2023-11-15

## 2023-11-15 PROBLEM — M43.17 SPONDYLOLISTHESIS OF LUMBOSACRAL REGION: Status: RESOLVED | Noted: 2023-11-15 | Resolved: 2023-11-15

## 2023-11-15 LAB
GROUP & RH: NORMAL
INDIRECT COOMBS GEL: NORMAL
SPECIMEN OUTDATE: NORMAL

## 2023-11-15 PROCEDURE — 61783 PR STEREOTACTIC COMP ASSIST PROC,SPINAL: ICD-10-PCS | Mod: 59,,, | Performed by: NEUROLOGICAL SURGERY

## 2023-11-15 PROCEDURE — 27201423 OPTIME MED/SURG SUP & DEVICES STERILE SUPPLY: Performed by: NEUROLOGICAL SURGERY

## 2023-11-15 PROCEDURE — 22842 INSERT SPINE FIXATION DEVICE: CPT | Mod: ,,, | Performed by: NEUROLOGICAL SURGERY

## 2023-11-15 PROCEDURE — D9220A PRA ANESTHESIA: Mod: ANES,,, | Performed by: ANESTHESIOLOGY

## 2023-11-15 PROCEDURE — 22614 PR ARTHRODESIS, POST/POSTLAT, SNGL INTERSPACE, EA ADDTL: ICD-10-PCS | Mod: ,,, | Performed by: NEUROLOGICAL SURGERY

## 2023-11-15 PROCEDURE — 36620 INSERTION CATHETER ARTERY: CPT

## 2023-11-15 PROCEDURE — 36000713 HC OR TIME LEV V EA ADD 15 MIN: Performed by: NEUROLOGICAL SURGERY

## 2023-11-15 PROCEDURE — C1713 ANCHOR/SCREW BN/BN,TIS/BN: HCPCS | Performed by: NEUROLOGICAL SURGERY

## 2023-11-15 PROCEDURE — 11000001 HC ACUTE MED/SURG PRIVATE ROOM

## 2023-11-15 PROCEDURE — 63047 LAM FACETEC & FORAMOT LUMBAR: CPT | Mod: 51,,, | Performed by: NEUROLOGICAL SURGERY

## 2023-11-15 PROCEDURE — 63600175 PHARM REV CODE 636 W HCPCS: Performed by: NEUROLOGICAL SURGERY

## 2023-11-15 PROCEDURE — 20930 PR ALLOGRAFT FOR SPINE SURGERY ONLY MORSELIZED: ICD-10-PCS | Mod: ,,, | Performed by: NEUROLOGICAL SURGERY

## 2023-11-15 PROCEDURE — D9220A PRA ANESTHESIA: Mod: CRNA,,, | Performed by: REGISTERED NURSE

## 2023-11-15 PROCEDURE — P9047 ALBUMIN (HUMAN), 25%, 50ML: HCPCS | Mod: JZ,JG | Performed by: REGISTERED NURSE

## 2023-11-15 PROCEDURE — 63600175 PHARM REV CODE 636 W HCPCS: Mod: JZ,JG | Performed by: ANESTHESIOLOGY

## 2023-11-15 PROCEDURE — 61783 SCAN PROC SPINAL: CPT | Mod: 59,,, | Performed by: NEUROLOGICAL SURGERY

## 2023-11-15 PROCEDURE — 25000003 PHARM REV CODE 250: Performed by: NEUROLOGICAL SURGERY

## 2023-11-15 PROCEDURE — D9220A PRA ANESTHESIA: ICD-10-PCS | Mod: ANES,,, | Performed by: ANESTHESIOLOGY

## 2023-11-15 PROCEDURE — 63600175 PHARM REV CODE 636 W HCPCS: Performed by: REGISTERED NURSE

## 2023-11-15 PROCEDURE — 63048 LAM FACETEC &FORAMOT EA ADDL: CPT | Mod: ,,, | Performed by: NEUROLOGICAL SURGERY

## 2023-11-15 PROCEDURE — 37000009 HC ANESTHESIA EA ADD 15 MINS: Performed by: NEUROLOGICAL SURGERY

## 2023-11-15 PROCEDURE — 63048 PR LAMINECT/FACETECT/FORAMINOT, EA ADDTL VERTEBRAL SEGM: ICD-10-PCS | Mod: ,,, | Performed by: NEUROLOGICAL SURGERY

## 2023-11-15 PROCEDURE — 25000003 PHARM REV CODE 250: Performed by: REGISTERED NURSE

## 2023-11-15 PROCEDURE — 20936 SP BONE AGRFT LOCAL ADD-ON: CPT | Mod: ,,, | Performed by: NEUROLOGICAL SURGERY

## 2023-11-15 PROCEDURE — 25000003 PHARM REV CODE 250: Performed by: NURSE ANESTHETIST, CERTIFIED REGISTERED

## 2023-11-15 PROCEDURE — A4216 STERILE WATER/SALINE, 10 ML: HCPCS | Performed by: REGISTERED NURSE

## 2023-11-15 PROCEDURE — 71000033 HC RECOVERY, INTIAL HOUR: Performed by: NEUROLOGICAL SURGERY

## 2023-11-15 PROCEDURE — 20930 SP BONE ALGRFT MORSEL ADD-ON: CPT | Mod: ,,, | Performed by: NEUROLOGICAL SURGERY

## 2023-11-15 PROCEDURE — 63600175 PHARM REV CODE 636 W HCPCS: Performed by: NURSE ANESTHETIST, CERTIFIED REGISTERED

## 2023-11-15 PROCEDURE — 71000039 HC RECOVERY, EACH ADD'L HOUR: Performed by: NEUROLOGICAL SURGERY

## 2023-11-15 PROCEDURE — 22612 PR ARTHRODESIS, POST/POSTLAT, SNGL INTERSPACE, LUMBAR: ICD-10-PCS | Mod: ,,, | Performed by: NEUROLOGICAL SURGERY

## 2023-11-15 PROCEDURE — 22842 PR POSTERIOR SEGMENTAL INSTRUMENTATION 3-6 VRT SEG: ICD-10-PCS | Mod: ,,, | Performed by: NEUROLOGICAL SURGERY

## 2023-11-15 PROCEDURE — 22614 ARTHRD PST TQ 1NTRSPC EA ADD: CPT | Mod: ,,, | Performed by: NEUROLOGICAL SURGERY

## 2023-11-15 PROCEDURE — 20936 PR AUTOGRAFT SPINE SURGERY LOCAL FROM SAME INCISION: ICD-10-PCS | Mod: ,,, | Performed by: NEUROLOGICAL SURGERY

## 2023-11-15 PROCEDURE — 27800903 OPTIME MED/SURG SUP & DEVICES OTHER IMPLANTS: Performed by: NEUROLOGICAL SURGERY

## 2023-11-15 PROCEDURE — 36000712 HC OR TIME LEV V 1ST 15 MIN: Performed by: NEUROLOGICAL SURGERY

## 2023-11-15 PROCEDURE — 63047 PR LAMINEC/FACETECT/FORAMIN,LUMBAR 1 SEG: ICD-10-PCS | Mod: 51,,, | Performed by: NEUROLOGICAL SURGERY

## 2023-11-15 PROCEDURE — D9220A PRA ANESTHESIA: ICD-10-PCS | Mod: CRNA,,, | Performed by: REGISTERED NURSE

## 2023-11-15 PROCEDURE — 37000008 HC ANESTHESIA 1ST 15 MINUTES: Performed by: NEUROLOGICAL SURGERY

## 2023-11-15 PROCEDURE — 22612 ARTHRD PST TQ 1NTRSPC LUMBAR: CPT | Mod: ,,, | Performed by: NEUROLOGICAL SURGERY

## 2023-11-15 PROCEDURE — 86901 BLOOD TYPING SEROLOGIC RH(D): CPT | Performed by: NEUROLOGICAL SURGERY

## 2023-11-15 PROCEDURE — P9047 ALBUMIN (HUMAN), 25%, 50ML: HCPCS | Mod: JZ,JG | Performed by: ANESTHESIOLOGY

## 2023-11-15 DEVICE — IMPLANTABLE DEVICE: Type: IMPLANTABLE DEVICE | Site: SPINE LUMBAR | Status: FUNCTIONAL

## 2023-11-15 DEVICE — PUTTY GRAFTON DBM 10X1CM: Type: IMPLANTABLE DEVICE | Site: SPINE LUMBAR | Status: FUNCTIONAL

## 2023-11-15 DEVICE — SET SCREW HORIZON SOLERA 5.5-6: Type: IMPLANTABLE DEVICE | Site: SPINE LUMBAR | Status: FUNCTIONAL

## 2023-11-15 DEVICE — GRAFT CHIPS FD 4-10MM 15CC: Type: IMPLANTABLE DEVICE | Site: SPINE LUMBAR | Status: FUNCTIONAL

## 2023-11-15 RX ORDER — METHOCARBAMOL 100 MG/ML
1000 INJECTION, SOLUTION INTRAMUSCULAR; INTRAVENOUS EVERY 8 HOURS PRN
Status: DISPENSED | OUTPATIENT
Start: 2023-11-15 | End: 2023-11-18

## 2023-11-15 RX ORDER — ACETAMINOPHEN 325 MG/1
650 TABLET ORAL EVERY 6 HOURS PRN
Status: DISCONTINUED | OUTPATIENT
Start: 2023-11-15 | End: 2023-11-19 | Stop reason: HOSPADM

## 2023-11-15 RX ORDER — DEXAMETHASONE SODIUM PHOSPHATE 4 MG/ML
INJECTION, SOLUTION INTRA-ARTICULAR; INTRALESIONAL; INTRAMUSCULAR; INTRAVENOUS; SOFT TISSUE
Status: DISCONTINUED | OUTPATIENT
Start: 2023-11-15 | End: 2023-11-15

## 2023-11-15 RX ORDER — AMOXICILLIN 250 MG
2 CAPSULE ORAL NIGHTLY PRN
Status: DISCONTINUED | OUTPATIENT
Start: 2023-11-15 | End: 2023-11-19 | Stop reason: HOSPADM

## 2023-11-15 RX ORDER — VASOPRESSIN 20 [USP'U]/ML
INJECTION, SOLUTION INTRAMUSCULAR; SUBCUTANEOUS
Status: DISCONTINUED | OUTPATIENT
Start: 2023-11-15 | End: 2023-11-15

## 2023-11-15 RX ORDER — SODIUM CHLORIDE 9 MG/ML
INJECTION, SOLUTION INTRAVENOUS CONTINUOUS
Status: DISCONTINUED | OUTPATIENT
Start: 2023-11-15 | End: 2023-11-16

## 2023-11-15 RX ORDER — PHENYLEPHRINE HCL IN 0.9% NACL 1 MG/10 ML
SYRINGE (ML) INTRAVENOUS
Status: DISCONTINUED | OUTPATIENT
Start: 2023-11-15 | End: 2023-11-15

## 2023-11-15 RX ORDER — MUPIROCIN 20 MG/G
OINTMENT TOPICAL 2 TIMES DAILY
Status: DISCONTINUED | OUTPATIENT
Start: 2023-11-16 | End: 2023-11-19 | Stop reason: HOSPADM

## 2023-11-15 RX ORDER — FENTANYL CITRATE 50 UG/ML
INJECTION, SOLUTION INTRAMUSCULAR; INTRAVENOUS
Status: DISCONTINUED | OUTPATIENT
Start: 2023-11-15 | End: 2023-11-15

## 2023-11-15 RX ORDER — FUROSEMIDE 40 MG/1
40 TABLET ORAL DAILY
Status: DISCONTINUED | OUTPATIENT
Start: 2023-11-16 | End: 2023-11-19 | Stop reason: HOSPADM

## 2023-11-15 RX ORDER — BUPIVACAINE HYDROCHLORIDE 5 MG/ML
INJECTION, SOLUTION EPIDURAL; INTRACAUDAL
Status: DISCONTINUED | OUTPATIENT
Start: 2023-11-15 | End: 2023-11-15 | Stop reason: HOSPADM

## 2023-11-15 RX ORDER — KETAMINE HYDROCHLORIDE 100 MG/ML
INJECTION, SOLUTION INTRAMUSCULAR; INTRAVENOUS
Status: DISCONTINUED | OUTPATIENT
Start: 2023-11-15 | End: 2023-11-15

## 2023-11-15 RX ORDER — METOPROLOL SUCCINATE 25 MG/1
25 TABLET, EXTENDED RELEASE ORAL DAILY
Status: DISCONTINUED | OUTPATIENT
Start: 2023-11-16 | End: 2023-11-19 | Stop reason: HOSPADM

## 2023-11-15 RX ORDER — ROCURONIUM BROMIDE 10 MG/ML
INJECTION, SOLUTION INTRAVENOUS
Status: DISCONTINUED | OUTPATIENT
Start: 2023-11-15 | End: 2023-11-15

## 2023-11-15 RX ORDER — ONDANSETRON 4 MG/1
8 TABLET, ORALLY DISINTEGRATING ORAL EVERY 6 HOURS PRN
Status: DISCONTINUED | OUTPATIENT
Start: 2023-11-15 | End: 2023-11-19 | Stop reason: HOSPADM

## 2023-11-15 RX ORDER — CEFAZOLIN SODIUM 2 G/50ML
2 SOLUTION INTRAVENOUS
Status: COMPLETED | OUTPATIENT
Start: 2023-11-15 | End: 2023-11-15

## 2023-11-15 RX ORDER — SODIUM CHLORIDE 0.9 % (FLUSH) 0.9 %
10 SYRINGE (ML) INJECTION
Status: DISCONTINUED | OUTPATIENT
Start: 2023-11-15 | End: 2023-11-15 | Stop reason: HOSPADM

## 2023-11-15 RX ORDER — PROPOFOL 10 MG/ML
VIAL (ML) INTRAVENOUS
Status: DISCONTINUED | OUTPATIENT
Start: 2023-11-15 | End: 2023-11-15

## 2023-11-15 RX ORDER — ALBUMIN HUMAN 250 G/1000ML
SOLUTION INTRAVENOUS
Status: DISCONTINUED | OUTPATIENT
Start: 2023-11-15 | End: 2023-11-15

## 2023-11-15 RX ORDER — PROCHLORPERAZINE EDISYLATE 5 MG/ML
5 INJECTION INTRAMUSCULAR; INTRAVENOUS EVERY 6 HOURS PRN
Status: DISCONTINUED | OUTPATIENT
Start: 2023-11-15 | End: 2023-11-19 | Stop reason: HOSPADM

## 2023-11-15 RX ORDER — ALBUMIN HUMAN 250 G/1000ML
12.5 SOLUTION INTRAVENOUS ONCE
Status: COMPLETED | OUTPATIENT
Start: 2023-11-15 | End: 2023-11-15

## 2023-11-15 RX ORDER — TADALAFIL 5 MG/1
5 TABLET ORAL DAILY
Status: DISCONTINUED | OUTPATIENT
Start: 2023-11-16 | End: 2023-11-19 | Stop reason: HOSPADM

## 2023-11-15 RX ORDER — EZETIMIBE 10 MG/1
10 TABLET ORAL DAILY
Status: DISCONTINUED | OUTPATIENT
Start: 2023-11-16 | End: 2023-11-19 | Stop reason: HOSPADM

## 2023-11-15 RX ORDER — ATORVASTATIN CALCIUM 40 MG/1
40 TABLET, FILM COATED ORAL DAILY
Status: DISCONTINUED | OUTPATIENT
Start: 2023-11-16 | End: 2023-11-19 | Stop reason: HOSPADM

## 2023-11-15 RX ORDER — EPHEDRINE SULFATE 50 MG/ML
INJECTION, SOLUTION INTRAVENOUS
Status: DISCONTINUED | OUTPATIENT
Start: 2023-11-15 | End: 2023-11-15

## 2023-11-15 RX ORDER — PROPOFOL 10 MG/ML
VIAL (ML) INTRAVENOUS CONTINUOUS PRN
Status: DISCONTINUED | OUTPATIENT
Start: 2023-11-15 | End: 2023-11-15

## 2023-11-15 RX ORDER — ASPIRIN 325 MG
50 TABLET, DELAYED RELEASE (ENTERIC COATED) ORAL DAILY
Status: DISCONTINUED | OUTPATIENT
Start: 2023-11-16 | End: 2023-11-19 | Stop reason: HOSPADM

## 2023-11-15 RX ORDER — GABAPENTIN 300 MG/1
300 CAPSULE ORAL ONCE
Status: CANCELLED | OUTPATIENT
Start: 2023-11-15

## 2023-11-15 RX ORDER — CHLORPHENIRAMINE MALEATE 4 MG
4 TABLET ORAL NIGHTLY
Status: DISCONTINUED | OUTPATIENT
Start: 2023-11-15 | End: 2023-11-19 | Stop reason: HOSPADM

## 2023-11-15 RX ORDER — LIDOCAINE HYDROCHLORIDE 20 MG/ML
INJECTION, SOLUTION EPIDURAL; INFILTRATION; INTRACAUDAL; PERINEURAL
Status: DISCONTINUED | OUTPATIENT
Start: 2023-11-15 | End: 2023-11-15

## 2023-11-15 RX ORDER — LIDOCAINE HYDROCHLORIDE 10 MG/ML
1 INJECTION, SOLUTION EPIDURAL; INFILTRATION; INTRACAUDAL; PERINEURAL ONCE
Status: CANCELLED | OUTPATIENT
Start: 2023-11-15 | End: 2023-11-15

## 2023-11-15 RX ORDER — SUCCINYLCHOLINE CHLORIDE 20 MG/ML
INJECTION INTRAMUSCULAR; INTRAVENOUS
Status: DISCONTINUED | OUTPATIENT
Start: 2023-11-15 | End: 2023-11-15

## 2023-11-15 RX ORDER — HYDROCODONE BITARTRATE AND ACETAMINOPHEN 10; 325 MG/1; MG/1
1 TABLET ORAL EVERY 6 HOURS PRN
Status: DISCONTINUED | OUTPATIENT
Start: 2023-11-15 | End: 2023-11-16

## 2023-11-15 RX ORDER — FAMOTIDINE 10 MG/ML
20 INJECTION INTRAVENOUS ONCE
Status: CANCELLED | OUTPATIENT
Start: 2023-11-15

## 2023-11-15 RX ORDER — LEVOCETIRIZINE DIHYDROCHLORIDE 5 MG/1
5 TABLET, FILM COATED ORAL DAILY
Status: DISCONTINUED | OUTPATIENT
Start: 2023-11-16 | End: 2023-11-19 | Stop reason: HOSPADM

## 2023-11-15 RX ORDER — EPINEPHRINE 1 MG/ML
INJECTION INTRAMUSCULAR; INTRAVENOUS; SUBCUTANEOUS
Status: DISCONTINUED | OUTPATIENT
Start: 2023-11-15 | End: 2023-11-15 | Stop reason: HOSPADM

## 2023-11-15 RX ORDER — MIDAZOLAM HYDROCHLORIDE 1 MG/ML
INJECTION INTRAMUSCULAR; INTRAVENOUS
Status: DISCONTINUED | OUTPATIENT
Start: 2023-11-15 | End: 2023-11-15

## 2023-11-15 RX ORDER — FUROSEMIDE 10 MG/ML
INJECTION INTRAMUSCULAR; INTRAVENOUS
Status: DISCONTINUED | OUTPATIENT
Start: 2023-11-15 | End: 2023-11-15

## 2023-11-15 RX ORDER — HYDROCODONE BITARTRATE AND ACETAMINOPHEN 5; 325 MG/1; MG/1
1 TABLET ORAL
Status: DISCONTINUED | OUTPATIENT
Start: 2023-11-15 | End: 2023-11-15 | Stop reason: HOSPADM

## 2023-11-15 RX ORDER — HYDROMORPHONE HYDROCHLORIDE 2 MG/ML
0.2 INJECTION, SOLUTION INTRAMUSCULAR; INTRAVENOUS; SUBCUTANEOUS EVERY 5 MIN PRN
Status: DISCONTINUED | OUTPATIENT
Start: 2023-11-15 | End: 2023-11-15 | Stop reason: HOSPADM

## 2023-11-15 RX ORDER — MORPHINE SULFATE 4 MG/ML
1 INJECTION, SOLUTION INTRAMUSCULAR; INTRAVENOUS EVERY 4 HOURS PRN
Status: DISCONTINUED | OUTPATIENT
Start: 2023-11-15 | End: 2023-11-19 | Stop reason: HOSPADM

## 2023-11-15 RX ORDER — DOCUSATE SODIUM 100 MG/1
100 CAPSULE, LIQUID FILLED ORAL 2 TIMES DAILY
Status: DISCONTINUED | OUTPATIENT
Start: 2023-11-15 | End: 2023-11-19 | Stop reason: HOSPADM

## 2023-11-15 RX ORDER — IPRATROPIUM BROMIDE 42 UG/1
2 SPRAY, METERED NASAL 2 TIMES DAILY
Status: DISCONTINUED | OUTPATIENT
Start: 2023-11-15 | End: 2023-11-19 | Stop reason: HOSPADM

## 2023-11-15 RX ORDER — CEFAZOLIN SODIUM 2 G/50ML
2 SOLUTION INTRAVENOUS
Status: DISCONTINUED | OUTPATIENT
Start: 2023-11-15 | End: 2023-11-19

## 2023-11-15 RX ORDER — HYDROMORPHONE HYDROCHLORIDE 2 MG/ML
INJECTION, SOLUTION INTRAMUSCULAR; INTRAVENOUS; SUBCUTANEOUS
Status: DISCONTINUED | OUTPATIENT
Start: 2023-11-15 | End: 2023-11-15

## 2023-11-15 RX ORDER — LIDOCAINE HYDROCHLORIDE AND EPINEPHRINE 5; 5 MG/ML; UG/ML
INJECTION, SOLUTION INFILTRATION; PERINEURAL
Status: DISCONTINUED | OUTPATIENT
Start: 2023-11-15 | End: 2023-11-15 | Stop reason: HOSPADM

## 2023-11-15 RX ORDER — FLUTICASONE PROPIONATE 50 MCG
1 SPRAY, SUSPENSION (ML) NASAL 2 TIMES DAILY
Status: DISCONTINUED | OUTPATIENT
Start: 2023-11-15 | End: 2023-11-19 | Stop reason: HOSPADM

## 2023-11-15 RX ORDER — FENTANYL CITRATE 50 UG/ML
25 INJECTION, SOLUTION INTRAMUSCULAR; INTRAVENOUS EVERY 5 MIN PRN
Status: DISCONTINUED | OUTPATIENT
Start: 2023-11-15 | End: 2023-11-15 | Stop reason: HOSPADM

## 2023-11-15 RX ORDER — ACETAMINOPHEN 10 MG/ML
INJECTION, SOLUTION INTRAVENOUS
Status: DISCONTINUED | OUTPATIENT
Start: 2023-11-15 | End: 2023-11-15

## 2023-11-15 RX ORDER — TAMSULOSIN HYDROCHLORIDE 0.4 MG/1
0.4 CAPSULE ORAL DAILY
Status: DISCONTINUED | OUTPATIENT
Start: 2023-11-16 | End: 2023-11-19 | Stop reason: HOSPADM

## 2023-11-15 RX ADMIN — PROPOFOL 100 MCG/KG/MIN: 10 INJECTION, EMULSION INTRAVENOUS at 10:11

## 2023-11-15 RX ADMIN — LIDOCAINE HYDROCHLORIDE 60 MG: 20 INJECTION, SOLUTION EPIDURAL; INFILTRATION; INTRACAUDAL; PERINEURAL at 10:11

## 2023-11-15 RX ADMIN — VASOPRESSIN 2 UNITS: 20 INJECTION INTRAVENOUS at 11:11

## 2023-11-15 RX ADMIN — PROPOFOL 200 MG: 10 INJECTION, EMULSION INTRAVENOUS at 10:11

## 2023-11-15 RX ADMIN — ACETAMINOPHEN 1000 MG: 10 INJECTION, SOLUTION INTRAVENOUS at 06:11

## 2023-11-15 RX ADMIN — REMIFENTANIL HYDROCHLORIDE 0.05 MCG/KG/MIN: 1 INJECTION, POWDER, LYOPHILIZED, FOR SOLUTION INTRAVENOUS at 10:11

## 2023-11-15 RX ADMIN — VASOPRESSIN 1 UNITS: 20 INJECTION INTRAVENOUS at 11:11

## 2023-11-15 RX ADMIN — Medication 100 MCG: at 10:11

## 2023-11-15 RX ADMIN — ROCURONIUM BROMIDE 10 MG: 10 SOLUTION INTRAVENOUS at 10:11

## 2023-11-15 RX ADMIN — Medication 100 MCG: at 11:11

## 2023-11-15 RX ADMIN — DEXMEDETOMIDINE 0.4 MCG/KG/HR: 200 INJECTION, SOLUTION INTRAVENOUS at 01:11

## 2023-11-15 RX ADMIN — KETAMINE HYDROCHLORIDE 10 MG: 100 INJECTION, SOLUTION, CONCENTRATE INTRAMUSCULAR; INTRAVENOUS at 10:11

## 2023-11-15 RX ADMIN — PROPOFOL 60 MG: 10 INJECTION, EMULSION INTRAVENOUS at 10:11

## 2023-11-15 RX ADMIN — CEFAZOLIN SODIUM 2 G: 2 SOLUTION INTRAVENOUS at 10:11

## 2023-11-15 RX ADMIN — ALBUMIN (HUMAN) 12.5 G: 12.5 SOLUTION INTRAVENOUS at 08:11

## 2023-11-15 RX ADMIN — SODIUM CHLORIDE, SODIUM GLUCONATE, SODIUM ACETATE, POTASSIUM CHLORIDE AND MAGNESIUM CHLORIDE: 526; 502; 368; 37; 30 INJECTION, SOLUTION INTRAVENOUS at 10:11

## 2023-11-15 RX ADMIN — VASOPRESSIN 1 UNITS: 20 INJECTION INTRAVENOUS at 01:11

## 2023-11-15 RX ADMIN — FENTANYL CITRATE 100 MCG: 50 INJECTION, SOLUTION INTRAMUSCULAR; INTRAVENOUS at 10:11

## 2023-11-15 RX ADMIN — ALBUMIN (HUMAN) 100 ML: 12.5 SOLUTION INTRAVENOUS at 11:11

## 2023-11-15 RX ADMIN — CEFAZOLIN SODIUM 2 G: 2 SOLUTION INTRAVENOUS at 02:11

## 2023-11-15 RX ADMIN — KETAMINE HYDROCHLORIDE 10 MG: 100 INJECTION, SOLUTION, CONCENTRATE INTRAMUSCULAR; INTRAVENOUS at 02:11

## 2023-11-15 RX ADMIN — SODIUM CHLORIDE: 9 INJECTION, SOLUTION INTRAVENOUS at 10:11

## 2023-11-15 RX ADMIN — DEXAMETHASONE SODIUM PHOSPHATE 8 MG: 4 INJECTION, SOLUTION INTRA-ARTICULAR; INTRALESIONAL; INTRAMUSCULAR; INTRAVENOUS; SOFT TISSUE at 06:11

## 2023-11-15 RX ADMIN — EPHEDRINE SULFATE 10 MG: 50 INJECTION INTRAVENOUS at 05:11

## 2023-11-15 RX ADMIN — PHENYLEPHRINE HYDROCHLORIDE 25 MCG/MIN: 10 INJECTION INTRAVENOUS at 10:11

## 2023-11-15 RX ADMIN — HYDROMORPHONE HYDROCHLORIDE 0.5 MG: 2 INJECTION, SOLUTION INTRAMUSCULAR; INTRAVENOUS; SUBCUTANEOUS at 01:11

## 2023-11-15 RX ADMIN — EPHEDRINE SULFATE 10 MG: 50 INJECTION INTRAVENOUS at 07:11

## 2023-11-15 RX ADMIN — EPHEDRINE SULFATE 10 MG: 50 INJECTION INTRAVENOUS at 06:11

## 2023-11-15 RX ADMIN — MIDAZOLAM HYDROCHLORIDE 2 MG: 1 INJECTION, SOLUTION INTRAMUSCULAR; INTRAVENOUS at 10:11

## 2023-11-15 RX ADMIN — KETAMINE HYDROCHLORIDE 15 MG: 100 INJECTION, SOLUTION, CONCENTRATE INTRAMUSCULAR; INTRAVENOUS at 11:11

## 2023-11-15 RX ADMIN — HYDROCODONE BITARTRATE AND ACETAMINOPHEN 1 TABLET: 10; 325 TABLET ORAL at 10:11

## 2023-11-15 RX ADMIN — SUCCINYLCHOLINE CHLORIDE 120 MG: 20 INJECTION, SOLUTION INTRAMUSCULAR; INTRAVENOUS at 10:11

## 2023-11-15 RX ADMIN — KETAMINE HYDROCHLORIDE 15 MG: 100 INJECTION, SOLUTION, CONCENTRATE INTRAMUSCULAR; INTRAVENOUS at 01:11

## 2023-11-15 RX ADMIN — FUROSEMIDE 20 MG: 10 INJECTION, SOLUTION INTRAMUSCULAR; INTRAVENOUS at 06:11

## 2023-11-15 RX ADMIN — DEXAMETHASONE SODIUM PHOSPHATE 8 MG: 4 INJECTION, SOLUTION INTRA-ARTICULAR; INTRALESIONAL; INTRAMUSCULAR; INTRAVENOUS; SOFT TISSUE at 10:11

## 2023-11-15 RX ADMIN — PROPOFOL 30 MG: 10 INJECTION, EMULSION INTRAVENOUS at 10:11

## 2023-11-15 NOTE — ANESTHESIA PREPROCEDURE EVALUATION
11/15/2023  Jose De La Vega is a 68 y.o., male.      Pre-op Assessment    I have reviewed the Patient Summary Reports.     I have reviewed the Nursing Notes. I have reviewed the NPO Status.   I have reviewed the Medications.     Review of Systems  Anesthesia Hx:  No problems with previous Anesthesia                Social:  Non-Smoker       Cardiovascular:     Hypertension Valvular problems/Murmurs  Denies MI.        Denies Angina.  Denies CHF.    hyperlipidemia    Hx AVR                     Hypertension         Pulmonary:    Denies COPD.  Denies Asthma.    Sleep Apnea     Obstructive Sleep Apnea (OLIVER).           Renal/:   Denies Chronic Renal Disease. no renal calculi               Hepatic/GI:      Denies GERD. Denies Liver Disease.  Denies Hepatitis.           Musculoskeletal:     Hx Cervical spine sx       Spine Disorders: cervical and lumbar            Neurological:    Neuromuscular Disease,  Headaches (migraine) Denies Seizures.    Neurogenic claudication  Numbness Both legs, Rt>Lt     Dx of Headaches                         Neuromuscular Disease   Endocrine:  Denies Diabetes. Denies Hypothyroidism.  Denies Hyperthyroidism.       Obesity / BMI > 30      Physical Exam  General: Well nourished, Cooperative, Alert and Oriented    Airway:  Mallampati: I   Mouth Opening: Normal  TM Distance: Normal  Tongue: Normal  Neck ROM: Normal ROM    Dental:  Intact        Anesthesia Plan  Type of Anesthesia, risks & benefits discussed:    Anesthesia Type: Gen ETT  Intra-op Monitoring Plan: Standard ASA Monitors and Art Line  Post Op Pain Control Plan: multimodal analgesia  Induction:  IV  Airway Plan: Video  Informed Consent: Informed consent signed with the Patient and all parties understand the risks and agree with anesthesia plan.  All questions answered. Patient consented to blood products? Yes  ASA Score:  3  Day of Surgery Review of History & Physical: H&P Update referred to the surgeon/provider.  Anesthesia Plan Notes: TIVA  TIVA      Ready For Surgery From Anesthesia Perspective.     .

## 2023-11-15 NOTE — ANESTHESIA PROCEDURE NOTES
Intubation    Date/Time: 11/15/2023 10:31 AM    Performed by: Elizabeth Thomas  Authorized by: Jose Francisco Queen DO    Intubation:     Induction:  Intravenous    Intubated:  Postinduction    Mask Ventilation:  Easy mask    Attempts:  1    Attempted By:  Student    Method of Intubation:  Direct    Blade:  Webb 4    Laryngeal View Grade: Grade III - only epiglottis visible      Difficult Airway Encountered?: No      Complications:  None    Airway Device:  Oral endotracheal tube    Airway Device Size:  7.5    Style/Cuff Inflation:  Cuffed (inflated to minimal occlusive pressure)    Inflation Amount (mL):  7    Tube secured:  22    Secured at:  The lips    Placement Verified By:  Capnometry    Complicating Factors:  None    Findings Post-Intubation:  BS equal bilateral and atraumatic/condition of teeth unchanged  Notes:      Midline neck position held throughout intubation. Minimal cuff pressure @25mmHg

## 2023-11-15 NOTE — ANESTHESIA PROCEDURE NOTES
Arterial    Diagnosis: Fusion, Spine Lumbar    Patient location during procedure: holding area  Procedure start time: 11/15/2023 9:46 AM  Timeout: 11/15/2023 9:45 AM  Procedure end time: 11/15/2023 9:50 AM    Staffing  Authorizing Provider: Jose Francisco Queen DO  Performing Provider: Elizabeth Thomas    Staffing  Performed by: Elizabeth Thomas  Authorized by: Jose Francisco Queen DO      Preanesthetic Checklist  Completed: patient identified, IV checked, site marked, risks and benefits discussed, surgical consent, monitors and equipment checked, pre-op evaluation, timeout performed and anesthesia consent givenArterial  Skin Prep: chlorhexidine gluconate  Local Infiltration: none and lidocaine  Orientation: right  Location: radial    Catheter Size: 20 G  Catheter placement by Anatomical landmarks and Ultrasound guidance. Heme positive aspiration all ports.   Vessel Caliber: medium, patent  Vascular Doppler:  not done  Needle advanced into vessel with real time Ultrasound guidance.Insertion Attempts: 1  Assessment  Dressing: secured with tape and tegaderm  Patient: Tolerated well

## 2023-11-16 ENCOUNTER — PATIENT MESSAGE (OUTPATIENT)
Dept: NEUROSURGERY | Facility: CLINIC | Age: 68
End: 2023-11-16
Payer: COMMERCIAL

## 2023-11-16 LAB
ANION GAP SERPL CALC-SCNC: 9 MEQ/L
BASOPHILS # BLD AUTO: 0.02 X10(3)/MCL
BASOPHILS NFR BLD AUTO: 0.2 %
BUN SERPL-MCNC: 11.1 MG/DL (ref 8.4–25.7)
CALCIUM SERPL-MCNC: 9.3 MG/DL (ref 8.8–10)
CHLORIDE SERPL-SCNC: 106 MMOL/L (ref 98–107)
CO2 SERPL-SCNC: 23 MMOL/L (ref 23–31)
CREAT SERPL-MCNC: 0.9 MG/DL (ref 0.73–1.18)
CREAT/UREA NIT SERPL: 12
EOSINOPHIL # BLD AUTO: 0 X10(3)/MCL (ref 0–0.9)
EOSINOPHIL NFR BLD AUTO: 0 %
ERYTHROCYTE [DISTWIDTH] IN BLOOD BY AUTOMATED COUNT: 13.3 % (ref 11.5–17)
GFR SERPLBLD CREATININE-BSD FMLA CKD-EPI: >60 MLS/MIN/1.73/M2
GLUCOSE SERPL-MCNC: 125 MG/DL (ref 82–115)
HCT VFR BLD AUTO: 39.2 % (ref 42–52)
HGB BLD-MCNC: 13.4 G/DL (ref 14–18)
IMM GRANULOCYTES # BLD AUTO: 0.04 X10(3)/MCL (ref 0–0.04)
IMM GRANULOCYTES NFR BLD AUTO: 0.4 %
LYMPHOCYTES # BLD AUTO: 1.07 X10(3)/MCL (ref 0.6–4.6)
LYMPHOCYTES NFR BLD AUTO: 10.5 %
MCH RBC QN AUTO: 29.1 PG (ref 27–31)
MCHC RBC AUTO-ENTMCNC: 34.2 G/DL (ref 33–36)
MCV RBC AUTO: 85 FL (ref 80–94)
MONOCYTES # BLD AUTO: 0.47 X10(3)/MCL (ref 0.1–1.3)
MONOCYTES NFR BLD AUTO: 4.6 %
NEUTROPHILS # BLD AUTO: 8.63 X10(3)/MCL (ref 2.1–9.2)
NEUTROPHILS NFR BLD AUTO: 84.3 %
NRBC BLD AUTO-RTO: 0 %
PLATELET # BLD AUTO: 175 X10(3)/MCL (ref 130–400)
PMV BLD AUTO: 11.4 FL (ref 7.4–10.4)
POTASSIUM SERPL-SCNC: 4 MMOL/L (ref 3.5–5.1)
RBC # BLD AUTO: 4.61 X10(6)/MCL (ref 4.7–6.1)
SODIUM SERPL-SCNC: 138 MMOL/L (ref 136–145)
WBC # SPEC AUTO: 10.23 X10(3)/MCL (ref 4.5–11.5)

## 2023-11-16 PROCEDURE — 97166 OT EVAL MOD COMPLEX 45 MIN: CPT

## 2023-11-16 PROCEDURE — 85025 COMPLETE CBC W/AUTO DIFF WBC: CPT | Performed by: NEUROLOGICAL SURGERY

## 2023-11-16 PROCEDURE — 97162 PT EVAL MOD COMPLEX 30 MIN: CPT

## 2023-11-16 PROCEDURE — 11000001 HC ACUTE MED/SURG PRIVATE ROOM

## 2023-11-16 PROCEDURE — 80048 BASIC METABOLIC PNL TOTAL CA: CPT | Performed by: NEUROLOGICAL SURGERY

## 2023-11-16 PROCEDURE — 63600175 PHARM REV CODE 636 W HCPCS: Performed by: NEUROLOGICAL SURGERY

## 2023-11-16 PROCEDURE — 25000242 PHARM REV CODE 250 ALT 637 W/ HCPCS: Performed by: NEUROLOGICAL SURGERY

## 2023-11-16 PROCEDURE — 99024 POSTOP FOLLOW-UP VISIT: CPT | Mod: ,,, | Performed by: NEUROLOGICAL SURGERY

## 2023-11-16 PROCEDURE — 99024 PR POST-OP FOLLOW-UP VISIT: ICD-10-PCS | Mod: ,,, | Performed by: NEUROLOGICAL SURGERY

## 2023-11-16 PROCEDURE — 25000003 PHARM REV CODE 250: Performed by: NEUROLOGICAL SURGERY

## 2023-11-16 PROCEDURE — 25000003 PHARM REV CODE 250: Performed by: PHYSICIAN ASSISTANT

## 2023-11-16 RX ORDER — POLYETHYLENE GLYCOL 3350 17 G/17G
17 POWDER, FOR SOLUTION ORAL DAILY
Status: DISCONTINUED | OUTPATIENT
Start: 2023-11-16 | End: 2023-11-19 | Stop reason: HOSPADM

## 2023-11-16 RX ORDER — OXYCODONE AND ACETAMINOPHEN 10; 325 MG/1; MG/1
1 TABLET ORAL EVERY 6 HOURS PRN
Status: DISCONTINUED | OUTPATIENT
Start: 2023-11-16 | End: 2023-11-19 | Stop reason: HOSPADM

## 2023-11-16 RX ADMIN — FLUTICASONE PROPIONATE 50 MCG: 50 SPRAY, METERED NASAL at 08:11

## 2023-11-16 RX ADMIN — MORPHINE SULFATE 1 MG: 4 INJECTION, SOLUTION INTRAMUSCULAR; INTRAVENOUS at 07:11

## 2023-11-16 RX ADMIN — HYDROCODONE BITARTRATE AND ACETAMINOPHEN 1 TABLET: 10; 325 TABLET ORAL at 09:11

## 2023-11-16 RX ADMIN — HYDROCODONE BITARTRATE AND ACETAMINOPHEN 1 TABLET: 10; 325 TABLET ORAL at 04:11

## 2023-11-16 RX ADMIN — MORPHINE SULFATE 1 MG: 4 INJECTION, SOLUTION INTRAMUSCULAR; INTRAVENOUS at 12:11

## 2023-11-16 RX ADMIN — ATORVASTATIN CALCIUM 40 MG: 40 TABLET, FILM COATED ORAL at 09:11

## 2023-11-16 RX ADMIN — CEFAZOLIN SODIUM 2 G: 2 SOLUTION INTRAVENOUS at 11:11

## 2023-11-16 RX ADMIN — MUPIROCIN: 20 OINTMENT TOPICAL at 08:11

## 2023-11-16 RX ADMIN — OXYCODONE HYDROCHLORIDE AND ACETAMINOPHEN 1 TABLET: 10; 325 TABLET ORAL at 02:11

## 2023-11-16 RX ADMIN — FLUTICASONE PROPIONATE 50 MCG: 50 SPRAY, METERED NASAL at 09:11

## 2023-11-16 RX ADMIN — DOCUSATE SODIUM 100 MG: 100 CAPSULE, LIQUID FILLED ORAL at 09:11

## 2023-11-16 RX ADMIN — DOCUSATE SODIUM 100 MG: 100 CAPSULE, LIQUID FILLED ORAL at 08:11

## 2023-11-16 RX ADMIN — TAMSULOSIN HYDROCHLORIDE 0.4 MG: 0.4 CAPSULE ORAL at 09:11

## 2023-11-16 RX ADMIN — MUPIROCIN: 20 OINTMENT TOPICAL at 09:11

## 2023-11-16 RX ADMIN — IPRATROPIUM BROMIDE 2 SPRAY: 42 SPRAY, METERED NASAL at 08:11

## 2023-11-16 RX ADMIN — POLYETHYLENE GLYCOL 3350 17 G: 17 POWDER, FOR SOLUTION ORAL at 12:11

## 2023-11-16 RX ADMIN — MORPHINE SULFATE 1 MG: 4 INJECTION, SOLUTION INTRAMUSCULAR; INTRAVENOUS at 08:11

## 2023-11-16 RX ADMIN — CEFAZOLIN SODIUM 2 G: 2 SOLUTION INTRAVENOUS at 07:11

## 2023-11-16 RX ADMIN — CEFAZOLIN SODIUM 2 G: 2 SOLUTION INTRAVENOUS at 03:11

## 2023-11-16 RX ADMIN — EZETIMIBE 10 MG: 10 TABLET ORAL at 09:11

## 2023-11-16 RX ADMIN — FUROSEMIDE 40 MG: 40 TABLET ORAL at 09:11

## 2023-11-16 RX ADMIN — TRAZODONE HYDROCHLORIDE 75 MG: 50 TABLET ORAL at 08:11

## 2023-11-16 RX ADMIN — IPRATROPIUM BROMIDE 2 SPRAY: 42 SPRAY, METERED NASAL at 09:11

## 2023-11-16 RX ADMIN — CHLORPHENIRAMINE MALEATE 4 MG: 4 TABLET ORAL at 08:11

## 2023-11-16 RX ADMIN — SODIUM CHLORIDE: 9 INJECTION, SOLUTION INTRAVENOUS at 08:11

## 2023-11-16 RX ADMIN — METOPROLOL SUCCINATE 25 MG: 25 TABLET, EXTENDED RELEASE ORAL at 09:11

## 2023-11-16 NOTE — OP NOTE
Neurosurgery Operative Note  [facility]      Patient Name: Jose De La Vega  MRN: [id number]  CSN:  [id number]  : 1955  Age:68 yrs  Sex:male  Admit Date: 11/15/2023    Surgery date: 11/15/2023   Surgeon(s) and Role:     * Lexie Espinoza MD - Primary  Assistant(s): None    Preop/ Preprocedure Diagnosis:  Grade I retrolisthesis of L3 on L4, Grade I anterior spondylolisthesis of L4 on L5 with instability, previous lumbar surgery, L4-5 stenosis with left neuroforaminal narrowing    Postop/ Postprocedure Diagnosis:  Grade I retrolisthesis of L3 on L4, Grade I anterior spondylolisthesis of L4 on L5 with instability, previous lumbar surgery, L4-5 stenosis with left neuroforaminal narrowing      Surgery:     1)  Redo lumbar surgery involving posterior lateral fusion with instrumentation from L3-4 to L4-5 with Medtronic Solera pedicle screws:         A.  L3- 6.5 x 45 mm pedicle screws bilaterally      B.  L4- 6.5 x 45 mm pedicle screws bilaterally      C.  L5- 6.5 x 45 mm pedicle screws bilaterally          D.  Placement of 80 mm erica on the right measuring 80 mm from L3 to L5           Placement of 70 mm erica on the left measuring 70 mm from L3 to L5        2)  Decompressive laminectomy at L3-4 and L4-5 with left L4-5 laminotomy, medial facetectomy, and foraminotomy     3)  Placement of local autograft, allograft, DBM, and [place] Strips bilaterally     4)  Use of Medtronic Stealth navigation and intraoperative O Arm for placement of pedicle screws and to verify final placement of spinal instrumentation     5)  Use of intraoperative C-arm      6)  Use of intraoperative neuromonitoring with impedance testing of lumbar pedicle screws, with confirmed impedance 15 or greater in each screw      Findings:  There was significant scar tissue adherent to the dura at the L4-5 level where the patient had previous bilateral L4-5 laminotomies.  An intraoperative decision was made to include a  L3-4 laminectomy with normal underlying dura in an effort to identify normal dural tissue.  Furthermore, an intraoperative decision was made not to proceed with a left L4-5 diskectomy due to extensive scar tissue.  A left L4-5 laminotomy, medial facetectomy, and foraminotomy was completed.  There was intraoperative decompression of the central thecal sac at L3-4 and L4-5, as well as along the left L5 nerve root.   The final C arm and O arm images demonstrated that the instrumentation was in good position.      Fluids: See anesthesia record  Estimated Blood Loss:  350 cc  Anesthesia Type: General  Blood Products: none  Specimens:  * No specimens in log *    Implants/Grafts:   Implant Name Type Inv. Item Serial No.  Lot No. LRB No. Used Action   6.5 x 45 screw    MEDTRONIC  N/A 6 Implanted   SET SCREW HORIZON SOLERA 5.5-6 - [id number]  SET SCREW HORIZON SOLERA 5.5-6  MEDTRONIC [place]  N/A 6 Implanted   70mm Andrae    MEDTRONIC  N/A 1 Implanted   80mm Andrae    MEDTRONIC  N/A 1 Implanted   GRAFT CHIPS FD 4-10MM 15CC - SN/A  GRAFT CHIPS FD 4-10MM 15CC N/A LIFENET N/A N/A 1 Implanted   PUTTY OSWALD DBM 10X1CM - [id number]  PUTTY OSWALD DBM 10X1CM [id number] MEDTRONIC [place] N/A N/A 1 Implanted   Medtronic [place] DBM Putty 5cc   [id number] MEDTRONIC N/A N/A 1 Implanted     Drain(s), Tube(s), Packing: Hemovac drain    Complications: No immediate    Preoperative Indications:   Mr. De La Vega is a 68 y.o. male who has a past medical history significant for hypertension, aortic valve replacement, and migraine headaches.  He is s/p 3 cervical spine surgeries 30 years ago in [place], [place] involving anterior fusions from C3-7.  The patient has also had 3 lumbar surgeries in the past including a L5-S1 ALIF (Globus) on 09/05/2014 by Dr. Howell.   In addition, he is s/p bilateral L4-5 laminotomies on 03/06/2020 with a SI joint fusion by Dr. Calix.  Mr. De La Vega presented as a follow up patient in the neurosurgery clinic  for chronic low back pain with radiation into his right-greater-than-left lateral legs with walking.  These symptoms have progressed in the last year.  He had a normal motor exam with chronic numbness in his bilateral medial thighs and bilateral lower extremities below the knees distally to his feet with significant numbness in his bilateral great toes. There were no signs of myelopathy.      I reviewed pertinent imaging studies with the patient.  Lumbar spine x-rays demonstrated minimal retrolisthesis of L3 on L4 and Grade I anterior spondylolisthesis of L4 on L5.  There were postoperative changes s/p a L5-S1 ALIF with hardware in place.  There was movement at L4-5 with anterolisthesis measuring 6 mm in the neutral view, 9 mm with flexion, and 5 mm with extension.  A MRI lumbar spine without gadolinium demonstrated postoperative changes s/p a L5-S1 ALIF.  At L4-5, there was a left disc herniation with mild stenosis and severe left neuroforaminal narrowing.     I discussed with Mr. De La Vega that although his low back pain and bilateral leg pain had partially improved with optimization of medical management, he was symptomatic from L4-5 anterior spondylolisthesis with instability as well as stenosis.  This L4-5 level was adjacent to his previous L5-S1 anterior fusion.  The patient was deemed a candidate for surgery, specifically a redo lumbar surgery involving left L4-5 diskectomy, L4-5 laminectomy, and posterolateral fusion at L3-4, L4-5.  I reviewed the risks, benefits, and alternatives of this surgery. Mr. De La Vega agrees to proceed.  All questions were answered to his satisfaction.      Operative Procedure:  The patient was brought to the operating room.  General endotracheal intubation was instituted by the anesthesia team. The patient was then turned prone onto a Crow frame with his arms and legs appropriately padded.  Neuromonitoring with SSEP, EMG, and MEP was performed throughout the procedure with  no changes from baseline.     It was anticipated that a midline incision be made on the patient's lower back along the same trajectory of his small, well healed midline scar from previous surgery at L4-5.  A C-arm image was performed that correctly identified the L3, L4, and L5 levels with specific identification of these lumbar pedicles before proceeding.  A previous ALIF at L5-S1 was used as a reference level.  The patient's back was prepped and draped in a normal sterile fashion.  A timeout was performed that correctly identified the patient, preoperative antibiotics, and procedure.    A solution of 0.5% Lidocane with epinephrine was infused into the anticipated incisional site. The initial skin incision was made with an 10 scalpel blade.  Bovie cautery was used for hemostasis and for carrying out this incision to the spinous processes. The Bovie was used for dissection from the midline to the lateral aspects of the bilateral facets.  There was significant scar tissue along the bilateral lamina at the L L4-5 level due to previous surgery.  Cerebellar and Zelpi retractors were placed for visualization.  A C-arm image was performed to identify the pedicles from L3 to L5.    A clamp with the Medtronic Stealth system was placed on the L2 spinous process.  An O arm image was obtained.    With intraoperative guidance from the Medtronic Stealth system and O arm images, bilateral pedicle screws at L3, L4, and L5 were placed.  At each level, the entry point for the screw was determined with a navigated drill.  Then, a series of steps involving the navigated, power-generated gaxit-sca-culptp tap, pedicle feeler without a navigated ball probe, pedicle feeler with a navigated ball probe, with final placement of a pedicle screw.  This was performed bilaterally from L3-4 to L4-5 with intraoperative navigation. Posterior lateral fusion with instrumentation was achieved with the [place] Solera system.            A.  L3- 6.5 x  45 mm pedicle screws bilaterally      B.  L4- 6.5 x 45 mm pedicle screws bilaterally      C.  L5- 6.5 x 45 mm pedicle screws bilaterally        F.  Placement of 80 mm erica on the right measuring 80 mm from L3 to L5          Placement of 70 mm erica on the left measuring 70 mm from L3 to L5        Use of intraoperative neuromonitoring with impedance testing was completed for the bilateral L3, L3, and L5 pedicle screws, with confirmed impedance 15 or greater in each screw.  Images with the O arm confirmed that this instrumentation was in good position.      At this time, the microscope was brought into the surgical field.  Significant scar tissue was dissected from the bone edges of the left L4-5 lamina.  Borders of bone were  from scar a curette.  A high-speed drill was used to complete a redo left L4-5 laminotomy, medial facetectomy, and foraminotomy.  There was significant scar tissue.  The left L5 nerve root was identified and decompressed, although encased in scar.  Therefore, an intraoperative decision was made to complete a left L4-5 laminotomy, medial facetectomy, and foraminotomy without a left L4-5 diskectomy due to difficulty mobilizing the left L5 nerve root.    The microscope was then removed from the surgical field.  Laminectomies at the L3-4 and L4-5 levels were then performed.  A rongeur was used to remove the spinous processes of L3, L4, and L5.  Using a technique with a curved curette and Kerrison punch, laminectomies at L3-4 and L4-5 with a laminotomy on the left L4-5 with completed.  Normal dura at the L3 level was adjacent to extremely scarred L4-5 level, which involved cautious dissection.    An 80 mm erica was placed between the L3, L4, and L5 pedicle screws on the right.  A 70 mm erica was placed between the L3, L4, and L5 pedicle screws on the left.  The set screw caps were secured. The surgical wound was copiously irrigated. Decortication was performed with the drill.  [employer]  Strips were placed bilaterally, spanning from L3 to L5 laterally.  A mixture of morselized local autograft, allograft, and DBM was placed laterally along the pedicle screws.       C-arm imaging with AP and lateral views demonstrated that the instrumentation was in proper position. Closure was then in order.  The fascia was infused with a solution of 0.5% Bupivicaine with epinephrine.  A medium size Hemovac drain was placed under the fascia and sutured into place with an 0 Vicryl. The fascia was closed with interrupted 0 Vicryl sutures. The subcutaneous layer was closed with interrupted, buried 2-0 Vicryl sutures.  The skin was everted and closed with staples.  Xeroform gauze, 4x4s, and Medipore tape were placed on top of the skin as the dressing.     The patient was brought back into the supine position and extubated.  He was transported to the PACU for further care.         Lexie Espinoza MD  Neurosurgery

## 2023-11-16 NOTE — ANESTHESIA POSTPROCEDURE EVALUATION
Anesthesia Post Evaluation    [facility]    Procedure(s) Performed: Procedure(s) (LRB):  FUSION, SPINE, LUMBAR, PLIF, USING COMPUTER-ASSISTED NAVIGATION (N/A)  LAMINOTOMY (Left)  LAMINECTOMY, SPINE (Bilateral)    Final Anesthesia Type: general      Patient location during evaluation: PACU  Patient participation: Yes- Able to Participate  Level of consciousness: awake  Post-procedure vital signs: reviewed and stable  Pain management: adequate  Airway patency: patent  OLIVER mitigation strategies: Multimodal analgesia  PONV status at discharge: No PONV  Anesthetic complications: no      Cardiovascular status: stable  Respiratory status: unassisted  Hydration status: euvolemic  Follow-up not needed.          Vitals Value Taken Time   /64 11/16/23 1656   Temp 36.7 °C (98.1 °F) 11/16/23 1656   Pulse 69 11/16/23 1656   Resp 17 11/16/23 1656   SpO2 97 % 11/16/23 1656         Event Time   Out of Recovery 21:00:00         Pain/Dinorah Score: Pain Rating Prior to Med Admin: 7 (11/16/2023  2:49 PM)  Pain Rating Post Med Admin: 4 (11/16/2023  3:49 PM)  Dinorah Score: 8 (11/15/2023  9:00 PM)

## 2023-11-16 NOTE — NURSING
Nurses Note -- 4 Eyes      11/16/2023   9:05 AM      Skin assessed during: Admit      [x] No Altered Skin Integrity Present    []Prevention Measures Documented      [] Yes- Altered Skin Integrity Present or Discovered   [] LDA Added if Not in Epic (Describe Wound)   [] New Altered Skin Integrity was Present on Admit and Documented in LDA   [] Wound Image Taken    Wound Care Consulted? No    Attending Nurse:  Jesus Garcia RN/Staff Member:  Maria Victoria

## 2023-11-16 NOTE — DISCHARGE INSTRUCTIONS
POSTERIOR THORACO-LUMBAR/ LUMBAR FUSION  DISCHARGE INSTRUCTIONS      1.  Wear your TLSO Brace when sitting upright and when you are out of bed.    Your brace will likely be discontinued after 3 months.      2.   Keep your incision clean and dry.    Your sutures or staples will be removed at your first postoperative follow-up appointment in approximately 14 days.   Place clean gauze and tape over your wound daily until your sutures or staples are removed.  Wait at least 72 hours from the time of your surgery to take a shower.  After showering, pat your incision dry and replace the wound dressing.  Do not immerse your incision in water for 4-6 weeks (e.g. bath tub, hot tub, swimming pool).      3.  Activity restrictions:  No lifting greater than 15 pounds for 3 months.  No bending, stooping, or twisting.  Avoid sitting in one position for over 30 minutes at a time.  No impact exercise or contact sports for at least 3 months.  No driving for at least 2 weeks.  To resume driving short distances (<30 minutes), you must be off of your narcotic medications and be able to comfortably brake suddenly, should the need arise.    Get up and walk.      4.  Contact your Neurosurgeon if the following occurs:  Signs and symptoms of infection, including fever above 101.5 degrees Fahrenheit and/or chills.  Redness, swelling, warmth, or drainage from the incision.  Any lasting changes in sensation, numbness, and/or tingling.  Increased weakness or increased pain.  Swelling of the foot and/or lower leg with calf pain.    Neurosurgery has office hours Monday through Friday, 8:00 AM to 4:00 PM except for holidays. There is an answering service available during non-office hours, with 24 hours neurosurgery coverage.  Report to the Emergency Department if you need immediate medical assistance.      Because you have had a fusion, you are not to take steroidal medications or non-steroidal anti-inflammatory (NSAID) medications such as Motrin/  Ibuprofen or Naprosyn/ Naproxen unless agreed upon with your neurosurgeon.      Please contact Dr. Espinoza's office for any questions or concerns.  Typically, your first follow-up appointment after a posterior thoraco-lumbar/ lumbar fusion surgery is approximately 14 days from the date of your operation.  At this time, sutures or staples will be removed.  For your second postoperative appointment in 4-6 weeks, an x-ray of your lumbar spine will be arranged in Radiology before your neurosurgery appointment.

## 2023-11-16 NOTE — TRANSFER OF CARE
"Anesthesia Transfer of Care Note    Patient: Jose [facility]    Procedure(s) Performed: Procedure(s) (LRB):  FUSION, SPINE, LUMBAR, PLIF, USING COMPUTER-ASSISTED NAVIGATION (N/A)  LAMINOTOMY (Left)  LAMINECTOMY, SPINE (Bilateral)    Patient location: PACU    Anesthesia Type: general    Transport from OR: Transported from OR on room air with adequate spontaneous ventilation    Post pain: adequate analgesia    Post assessment: no apparent anesthetic complications    Post vital signs: stable    Level of consciousness: sedated    Nausea/Vomiting: no nausea/vomiting    Complications: none    Transfer of care protocol was followed      Last vitals: Visit Vitals  BP (!) 87/51   Pulse (!) 59   Temp 36.9 °C (98.4 °F) (Oral)   Resp 14   Ht 5' 11" (1.803 m)   Wt 114.8 kg (253 lb)   SpO2 95%   BMI 35.29 kg/m²     "

## 2023-11-16 NOTE — PT/OT/SLP EVAL
Occupational Therapy  Evaluation    Name: Jose De La Vega  MRN: [id number]  Admitting Diagnosis: lower back pain  Recent Surgery: Procedure(s) (LRB):  FUSION, SPINE, LUMBAR, PLIF, USING COMPUTER-ASSISTED NAVIGATION (N/A)  LAMINOTOMY (Left)  LAMINECTOMY, SPINE (Bilateral) 1 Day Post-Op    Recommendations:     Discharge therapy intensity: low intensity therapy    Discharge Equipment Recommendations:     Barriers to discharge:       Assessment:     Jose De La Vega is a 68 y.o. male with a medical diagnosis of lumbar stenosis with neurogenic claudication s/p lumbar surgery involving posterior lateral fusion with instrumentation from L3-4 to L4-5.  He presents with the following performance deficits affecting function: impaired self care skills, impaired functional mobility, pain, weakness, impaired balance, orthopedic precautions.   Pt tolerated initial evaluation well. Pt lives with wife and 22 year old daughter with special needs in a single level home with one step to enter. Pt has walk-in shower with bench and wife is present 24/7 to assist with any needs. Pt required max A for donning socks 2/2 inability to obtain figure-four position while seated 2/2 pain. Pt required Min A for initial sit>stand using RW and ambulated to toilet and completed toilet t/f with CGA using RW. Pt would benefit from low intensity therapy upon dc for increased functional independence.     Rehab Prognosis: Good; patient would benefit from acute skilled OT services to address these deficits and reach maximum level of function.       Plan:     Patient to be seen 5 x/week to address the above listed problems via self-care/home management, therapeutic activities, therapeutic exercises  Plan of Care Expires: 12/15/23  Plan of Care Reviewed with:      Subjective     Chief Complaint: low back pain  Patient/Family Comments/goals: to get better    Occupational Profile:  Living Environment: lives with wife and daughter in [facility] with 1 GISSEL;  walk in shower with bench  Previous level of function: Independent  Roles and Routines: , , drives  Equipment Used at Home:    Assistance upon Discharge: wife    Pain/Comfort:  Pain Rating 1: 5/10  Location 1: back (lower back)  Pain Addressed 1: Reposition, Distraction    Patients cultural, spiritual, Sikhism conflicts given the current situation: no    Objective:     OT communicated with RN prior to session.      Patient was found up in chair with LSO brace donned, blood pressure cuff, krishna catheter, pulse ox (continuous), hemovac, peripheral IV, SCD upon OT entry to room.    General Precautions: Standard,    Orthopedic Precautions: spinal precautions  Braces:  LSO    Vital Signs: Blood Pressure: pre 117/74, post 130/76   HR: 74  Sp02: 98  Respiratory Status: on room air    Functional Mobility/Transfers:  Patient completed Sit <> Stand Transfer with minimum assistance  with  rolling walker   Patient completed Toilet Transfer Step Transfer technique with contact guard assistance with  rolling walker  Functional Mobility: no LOB, CGA using RW    Activities of Daily Living:  Lower Body Dressing: maximal assistance for socks; would benefit from AD  Toileting: contact guard assistance      Functional Cognition:  Intact    Visual Perceptual Skills:  Intact    Upper Extremity Function:  Right Upper Extremity:   Strength: 5/5  Sensation: WFL    Left Upper Extremity:  Strength: 5/5  Sensation: WFL    Balance:   Dynamic standing balance:CGA at RW    Therapeutic Positioning  Risk for acquired pressure injuries is decreased due to ability to get to BSC/toilet with assist.    OT interventions performed during the course of today's session:   Education was provided on benefits of and recommendations for therapeutic positioning    Skin assessment: all bony prominences were assessed    Findings: no redness or breakdown noted    OT recommendations for therapeutic positioning throughout hospitalization:   Follow  [facility] Pressure Injury Prevention Protocol    Patient Education:  Patient and spouse were provided with verbal education education regarding OT role/goals/POC, post op precautions, fall prevention, and safety awareness.  Understanding was verbalized.     Patient left up in chair with all lines intact, call button in reach, and wife present    GOALS:   Multidisciplinary Problems       Occupational Therapy Goals          Problem: Occupational Therapy    Goal Priority Disciplines Outcome Interventions   Occupational Therapy Goal     OT, PT/OT Ongoing, Progressing    Description: Goals to be met by: in 1 month     Patient will increase functional independence with ADLs by performing:    UE Dressing with Modified [place].  LE Dressing with Modified [place] using AD as needed.  Grooming while standing at sink with Modified [place].  Toileting from toilet with Modified [place] for hygiene and clothing management.   Toilet transfer to toilet with Modified [place].                         History:     Past Medical History:   Diagnosis Date    Arthritis     Essential (primary) hypertension     Fibromyalgia     Heart murmur     High cholesterol     Leg pain     Low back pain     Lumbar pain     Lumbar stenosis with neurogenic claudication     Migraine     Numbness and tingling of foot     Obesity     Ruptured lumbar disc     Seasonal allergies     Sleep apnea     uses CPAP    Spondylolisthesis of lumbosacral region          Past Surgical History:   Procedure Laterality Date    AORTIC VALVE REPLACEMENT      x2    CERVICAL SPINE SURGERY      x3    COLONOSCOPY      KNEE ARTHROSCOPY W/ MENISCAL REPAIR Left     LAMINECTOMY Bilateral 11/15/2023    Procedure: LAMINECTOMY, SPINE;  Surgeon: Lexie Espinoza MD;  Location: [facility];  Service: Neurosurgery;  Laterality: Bilateral;  L3-4, L4-5    LAMINOTOMY Left 11/15/2023    Procedure: LAMINOTOMY;  Surgeon: Lexie Espinoza MD;  Location: [facility];  Service:  Neurosurgery;  Laterality: Left;  L4-5    LUMBAR SPINE SURGERY      2015 & 2020    POSTERIOR LUMBAR INTERBODY FUSION (PLIF) WITH COMPUTER-ASSISTED NAVIGATION N/A 11/15/2023    Procedure: FUSION, SPINE, LUMBAR, PLIF, USING COMPUTER-ASSISTED NAVIGATION;  Surgeon: Lexie Espinoza MD;  Location: [facility];  Service: Neurosurgery;  Laterality: N/A;  posterolateral fusion at L3-4, L4-5    TONSILLECTOMY      TRANSFORAMINAL EPIDURAL INJECTION OF STEROID Right 10/05/2023    Procedure: INJECTION, STEROID, EPIDURAL, TRANSFORAMINAL APPROACH Right L4 L5;  Surgeon: Florence Mcgee MD;  Location: [facility];  Service: Pain Management;  Laterality: Right;  Rt L4 & L5    VASECTOMY      WRIST SURGERY Right        Time Tracking:     OT Date of Treatment: 11/16/23  OT Start Time: 1120  OT Stop Time: 1134  OT Total Time (min): 14 min    Billable Minutes:Evaluation Moderate complexity    11/16/2023

## 2023-11-16 NOTE — PLAN OF CARE
Problem: Occupational Therapy  Goal: Occupational Therapy Goal  Description: Goals to be met by: in 1 month     Patient will increase functional independence with ADLs by performing:    UE Dressing with Modified [place].  LE Dressing with Modified [place] using AD as needed.  Grooming while standing at sink with Modified [place].  Toileting from toilet with Modified [place] for hygiene and clothing management.   Toilet transfer to toilet with Modified [place].    Outcome: Ongoing, Progressing

## 2023-11-16 NOTE — PLAN OF CARE
Problem: Physical Therapy  Goal: Physical Therapy Goal  Description: Pt will be seen for the following goals  1. Pt will go sit from supine and supine to sit using a log roll with sba  2. Pt will go sit to stand with rw and lso brace on with mod ind  3. Pt. Will ambulate with a rw and lso brace on for 300ft mod ind  Outcome: Ongoing, Progressing

## 2023-11-16 NOTE — PT/OT/SLP EVAL
Physical Therapy Evaluation    Patient Name:  Jose De La Vega   MRN:  [id number]    Recommendations:     Discharge therapy intensity: Low Intensity Therapy   Discharge Equipment Recommendations: none   Barriers to discharge: None    Assessment:     Jose De La Vega is a 68 y.o. male admitted with a medical diagnosis of Lumbar stenosis with neurogenic claudication.  He presents with the following impairments/functional limitations: impaired self care skills, impaired functional mobility, pain .    Rehab Prognosis: Good; patient would benefit from acute skilled PT services to address these deficits and reach maximum level of function.    Recent Surgery: Procedure(s) (LRB):  FUSION, SPINE, LUMBAR, PLIF, USING COMPUTER-ASSISTED NAVIGATION (N/A)  LAMINOTOMY (Left)  LAMINECTOMY, SPINE (Bilateral) 1 Day Post-Op    Plan:     During this hospitalization, patient to be seen 6 x/week to address the identified rehab impairments via gait training, therapeutic activities and progress toward the following goals:    Plan of Care Expires:  12/07/23    Subjective     Chief Complaint:   Patient/Family Comments/goals:   Pain/Comfort:  Pain Rating 1: 6/10  Location - Side 1: Bilateral  Location 1: back    Patients cultural, spiritual, [employer] conflicts given the current situation:      Living Environment:  Pt lives with wife and daughter (downs syndrome) in house with a 1 step at entrance but no steps in house  Prior to admission, patients level of function was ind to mod ind.  Equipment used at home: walker, rolling, bath bench, grab bar.  DME owned (not currently used): .  Upon discharge, patient will have assistance from wife.    Objective:     Communicated with nurse prior to session.  Patient found supine with blood pressure cuff, krishna catheter, pulse ox (continuous)  upon PT entry to room.    General Precautions: Standard,    Orthopedic Precautions:spinal precautions   Braces: LSO  Respiratory Status: Room  air  Blood Pressure:       Exams:  RLE ROM: WFL  RLE Strength: WFL  LLE ROM: WFL  LLE Strength: WFL  Skin integrity:     Functional Mobility:  Bed Mobility:     Rolling Right: stand by assistance  Supine to Sit: minimum assistance  Transfers:     Sit to Stand:  contact guard assistance and minimum assistance with rolling walker  Bed to Chair: contact guard assistance and minimum assistance with  rolling walker  using  Step Transfer  Gait: amb 200ft with a rw sba with lso brace on      AM-PAC 6 CLICK MOBILITY  Total Score:        Treatment & Education:      Patient and spouse were provided with verbal education education regarding POC.  Understanding was verbalized.     Patient left up in chair with all lines intact and call button in reach.    GOALS:   Multidisciplinary Problems       Physical Therapy Goals          Problem: Physical Therapy    Goal Priority Disciplines Outcome Goal Variances Interventions   Physical Therapy Goal     PT, PT/OT Ongoing, Progressing     Description: Pt will be seen for the following goals  1. Pt will go sit from supine and supine to sit using a log roll with sba  2. Pt will go sit to stand with rw and lso brace on with mod ind  3. Pt. Will ambulate with a rw and lso brace on for 300ft mod ind                       History:     Past Medical History:   Diagnosis Date    Arthritis     Essential (primary) hypertension     Fibromyalgia     Heart murmur     High cholesterol     Leg pain     Low back pain     Lumbar pain     Lumbar stenosis with neurogenic claudication     Migraine     Numbness and tingling of foot     Obesity     Ruptured lumbar disc     Seasonal allergies     Sleep apnea     uses CPAP    Spondylolisthesis of lumbosacral region        Past Surgical History:   Procedure Laterality Date    AORTIC VALVE REPLACEMENT      x2    CERVICAL SPINE SURGERY      x3    COLONOSCOPY      KNEE ARTHROSCOPY W/ MENISCAL REPAIR Left     LAMINECTOMY Bilateral 11/15/2023    Procedure:  LAMINECTOMY, SPINE;  Surgeon: Lexie Espinoza MD;  Location: [facility] OR;  Service: Neurosurgery;  Laterality: Bilateral;  L3-4, L4-5    LAMINOTOMY Left 11/15/2023    Procedure: LAMINOTOMY;  Surgeon: Lexie Espinoza MD;  Location: [facility] OR;  Service: Neurosurgery;  Laterality: Left;  L4-5    LUMBAR SPINE SURGERY      2015 & 2020    POSTERIOR LUMBAR INTERBODY FUSION (PLIF) WITH COMPUTER-ASSISTED NAVIGATION N/A 11/15/2023    Procedure: FUSION, SPINE, LUMBAR, PLIF, USING COMPUTER-ASSISTED NAVIGATION;  Surgeon: Lexie Espinoza MD;  Location: [facility];  Service: Neurosurgery;  Laterality: N/A;  posterolateral fusion at L3-4, L4-5    TONSILLECTOMY      TRANSFORAMINAL EPIDURAL INJECTION OF STEROID Right 10/05/2023    Procedure: INJECTION, STEROID, EPIDURAL, TRANSFORAMINAL APPROACH Right L4 L5;  Surgeon: Florence Mcgee MD;  Location: [facility] OR;  Service: Pain Management;  Laterality: Right;  Rt L4 & L5    VASECTOMY      WRIST SURGERY Right        Time Tracking:     PT Received On:    PT Start Time: 1050     PT Stop Time: 1115  PT Total Time (min): 25 min     Billable Minutes: Evaluation 25 11/16/2023

## 2023-11-16 NOTE — PROGRESS NOTES
POD#1 redo lumbar surgery involving posterior lateral fusion with instrumentation from L3-4 to L4-5   He is lying flat in bed, NAD  He c/o pain at the operative site  He c/o pain in his right medial calf, increased d/t SCD  His right thigh pain has improved  He is tolerating PO  Awaiting LSO brace    AFVSS  5/5 motor in bilateral UE and LE  Sensation grossly intact to bilateral LE  Incision c/d/I  Drain output 50/350, sanguineous    Plan: Continue drain; continue Ancef as long as drain in place  Continue current dressing  LSO for HOB>30  PT/OT once LSO brace obtained  SCDs for DVT prophylaxis

## 2023-11-17 LAB
ANION GAP SERPL CALC-SCNC: 5 MEQ/L
BASOPHILS # BLD AUTO: 0.05 X10(3)/MCL
BASOPHILS NFR BLD AUTO: 0.5 %
BUN SERPL-MCNC: 12.2 MG/DL (ref 8.4–25.7)
CALCIUM SERPL-MCNC: 9.2 MG/DL (ref 8.8–10)
CHLORIDE SERPL-SCNC: 109 MMOL/L (ref 98–107)
CO2 SERPL-SCNC: 26 MMOL/L (ref 23–31)
CREAT SERPL-MCNC: 0.84 MG/DL (ref 0.73–1.18)
CREAT/UREA NIT SERPL: 15
EOSINOPHIL # BLD AUTO: 0.06 X10(3)/MCL (ref 0–0.9)
EOSINOPHIL NFR BLD AUTO: 0.6 %
ERYTHROCYTE [DISTWIDTH] IN BLOOD BY AUTOMATED COUNT: 13.7 % (ref 11.5–17)
GFR SERPLBLD CREATININE-BSD FMLA CKD-EPI: >60 MLS/MIN/1.73/M2
GLUCOSE SERPL-MCNC: 100 MG/DL (ref 82–115)
HCT VFR BLD AUTO: 36.3 % (ref 42–52)
HGB BLD-MCNC: 12.4 G/DL (ref 14–18)
IMM GRANULOCYTES # BLD AUTO: 0.04 X10(3)/MCL (ref 0–0.04)
IMM GRANULOCYTES NFR BLD AUTO: 0.4 %
LYMPHOCYTES # BLD AUTO: 3.3 X10(3)/MCL (ref 0.6–4.6)
LYMPHOCYTES NFR BLD AUTO: 31.8 %
MCH RBC QN AUTO: 29.5 PG (ref 27–31)
MCHC RBC AUTO-ENTMCNC: 34.2 G/DL (ref 33–36)
MCV RBC AUTO: 86.2 FL (ref 80–94)
MONOCYTES # BLD AUTO: 1.04 X10(3)/MCL (ref 0.1–1.3)
MONOCYTES NFR BLD AUTO: 10 %
NEUTROPHILS # BLD AUTO: 5.9 X10(3)/MCL (ref 2.1–9.2)
NEUTROPHILS NFR BLD AUTO: 56.7 %
NRBC BLD AUTO-RTO: 0 %
PLATELET # BLD AUTO: 169 X10(3)/MCL (ref 130–400)
PMV BLD AUTO: 11.4 FL (ref 7.4–10.4)
POTASSIUM SERPL-SCNC: 4.1 MMOL/L (ref 3.5–5.1)
RBC # BLD AUTO: 4.21 X10(6)/MCL (ref 4.7–6.1)
SODIUM SERPL-SCNC: 140 MMOL/L (ref 136–145)
WBC # SPEC AUTO: 10.39 X10(3)/MCL (ref 4.5–11.5)

## 2023-11-17 PROCEDURE — 11000001 HC ACUTE MED/SURG PRIVATE ROOM

## 2023-11-17 PROCEDURE — 99024 PR POST-OP FOLLOW-UP VISIT: ICD-10-PCS | Mod: ,,, | Performed by: NEUROLOGICAL SURGERY

## 2023-11-17 PROCEDURE — 80048 BASIC METABOLIC PNL TOTAL CA: CPT | Performed by: NEUROLOGICAL SURGERY

## 2023-11-17 PROCEDURE — 63600175 PHARM REV CODE 636 W HCPCS: Performed by: NEUROLOGICAL SURGERY

## 2023-11-17 PROCEDURE — 99024 POSTOP FOLLOW-UP VISIT: CPT | Mod: ,,, | Performed by: NEUROLOGICAL SURGERY

## 2023-11-17 PROCEDURE — 25000003 PHARM REV CODE 250: Performed by: PHYSICIAN ASSISTANT

## 2023-11-17 PROCEDURE — 25000003 PHARM REV CODE 250: Performed by: NEUROLOGICAL SURGERY

## 2023-11-17 PROCEDURE — 85025 COMPLETE CBC W/AUTO DIFF WBC: CPT | Performed by: NEUROLOGICAL SURGERY

## 2023-11-17 RX ORDER — CYCLOBENZAPRINE HCL 10 MG
10 TABLET ORAL 3 TIMES DAILY PRN
Qty: 60 TABLET | Refills: 1 | Status: SHIPPED | OUTPATIENT
Start: 2023-11-17

## 2023-11-17 RX ORDER — OXYCODONE AND ACETAMINOPHEN 10; 325 MG/1; MG/1
1 TABLET ORAL EVERY 6 HOURS PRN
Qty: 28 TABLET | Refills: 0 | Status: SHIPPED | OUTPATIENT
Start: 2023-11-17 | End: 2023-11-19 | Stop reason: SDUPTHER

## 2023-11-17 RX ADMIN — TAMSULOSIN HYDROCHLORIDE 0.4 MG: 0.4 CAPSULE ORAL at 09:11

## 2023-11-17 RX ADMIN — CEFAZOLIN SODIUM 2 G: 2 SOLUTION INTRAVENOUS at 06:11

## 2023-11-17 RX ADMIN — OXYCODONE HYDROCHLORIDE AND ACETAMINOPHEN 1 TABLET: 10; 325 TABLET ORAL at 12:11

## 2023-11-17 RX ADMIN — OXYCODONE HYDROCHLORIDE AND ACETAMINOPHEN 1 TABLET: 10; 325 TABLET ORAL at 04:11

## 2023-11-17 RX ADMIN — EZETIMIBE 10 MG: 10 TABLET ORAL at 09:11

## 2023-11-17 RX ADMIN — FLUTICASONE PROPIONATE 50 MCG: 50 SPRAY, METERED NASAL at 09:11

## 2023-11-17 RX ADMIN — DOCUSATE SODIUM 100 MG: 100 CAPSULE, LIQUID FILLED ORAL at 09:11

## 2023-11-17 RX ADMIN — TRAZODONE HYDROCHLORIDE 75 MG: 50 TABLET ORAL at 08:11

## 2023-11-17 RX ADMIN — METOPROLOL SUCCINATE 25 MG: 25 TABLET, EXTENDED RELEASE ORAL at 09:11

## 2023-11-17 RX ADMIN — METHOCARBAMOL 1000 MG: 100 INJECTION INTRAMUSCULAR; INTRAVENOUS at 03:11

## 2023-11-17 RX ADMIN — IPRATROPIUM BROMIDE 2 SPRAY: 42 SPRAY, METERED NASAL at 09:11

## 2023-11-17 RX ADMIN — POLYETHYLENE GLYCOL 3350 17 G: 17 POWDER, FOR SOLUTION ORAL at 09:11

## 2023-11-17 RX ADMIN — ATORVASTATIN CALCIUM 40 MG: 40 TABLET, FILM COATED ORAL at 09:11

## 2023-11-17 RX ADMIN — CEFAZOLIN SODIUM 2 G: 2 SOLUTION INTRAVENOUS at 11:11

## 2023-11-17 RX ADMIN — CEFAZOLIN SODIUM 2 G: 2 SOLUTION INTRAVENOUS at 04:11

## 2023-11-17 RX ADMIN — CHLORPHENIRAMINE MALEATE 4 MG: 4 TABLET ORAL at 08:11

## 2023-11-17 RX ADMIN — MUPIROCIN: 20 OINTMENT TOPICAL at 08:11

## 2023-11-17 RX ADMIN — MUPIROCIN: 20 OINTMENT TOPICAL at 09:11

## 2023-11-17 RX ADMIN — FUROSEMIDE 40 MG: 40 TABLET ORAL at 09:11

## 2023-11-17 RX ADMIN — DOCUSATE SODIUM 100 MG: 100 CAPSULE, LIQUID FILLED ORAL at 08:11

## 2023-11-17 RX ADMIN — OXYCODONE HYDROCHLORIDE AND ACETAMINOPHEN 1 TABLET: 10; 325 TABLET ORAL at 07:11

## 2023-11-17 NOTE — PLAN OF CARE
Problem: Infection  Goal: Absence of Infection Signs and Symptoms  Outcome: Ongoing, Progressing     Problem: Fall Injury Risk  Goal: Absence of Fall and Fall-Related Injury  Outcome: Ongoing, Progressing     Problem: Adult Inpatient Plan of Care  Goal: Optimal Comfort and Wellbeing  Outcome: Ongoing, Progressing     Problem: Adult Inpatient Plan of Care  Goal: Absence of Hospital-Acquired Illness or Injury  Outcome: Ongoing, Progressing

## 2023-11-17 NOTE — PT/OT/SLP PROGRESS
Talked with his nurse and he stated that pt was getting up on his own and walking on his own. I then talked to pt and he knows his spinal precautions, he put his own brace on IND, and he is walking and getting up with a rw independently. Therefore, I will discharge him from skilled PT.

## 2023-11-17 NOTE — PROGRESS NOTES
[facility]  History & Physical  Neurosurgery      Jose De La Vega   [id number]   1955     SUBJECTIVE:     CHIEF COMPLAINT:  2 week postoperative visit    HPI:  Jose De La Vega is a 68 y.o. male who presents for a 2 week post-operative follow-up.  He is s/p redo lumbar surgery involving posterior lateral fusion with instrumentation from L3-4 to L4-5 by Dr. Espinoza on 11/15/2023.  The patient had an uneventful recovery from surgery and was discharged home on 11/19/2023.    Prior to surgical intervention the patient reported lower back pain radiating into the right-greater-than-left lateral hip and lateral thigh.  He also describes decreased sensation along the anterior ankle and dorsal feet.  Described a cramping sensation in the proximal calves bilaterally as well as a heaviness intermittently into the lower extremities.  He reported his right foot would catch on the floor when he felt the heaviness in his legs.  He states increased standing would aggravate his symptoms significantly.  The patient has a significant history of L5-S1 ALIF with Dr. Howell on 9/5/2014.  Also underwent a bilateral L4-5 decompression and right SI joint fusion with Dr. Calix on 3/6/2020.  He had seen some relief following this procedure for approximately 9 months although unfortunately his symptoms recurred and became more problematic over the summer.    The patient returns today reporting the symptoms that are better since surgery.  The patient is complaining of muscular stiffness and incisional pain in the lumbar spine.  He reports resolution of his previous lower extremity symptoms following surgical intervention.  He does continue to have some numbness to the left medial thigh and calf which he states has been present following an episode of shingles several years ago.  He denies any new onset of weakness or numbness.  The patient rates the pain 4/10 on the pain scale today. The patient denies disturbances in  bowel or bladder function.  There is no difficulty with balance. He denies any fevers. He denies redness, warmth, drainage, swelling or tenderness to the surgical site.  He has yet to begin outpatient physical therapy although has been walking at home for exercise without any issues.  He states he has remained compliant with his TLSO brace.    Past Medical History:   Diagnosis Date    Arthritis     Essential (primary) hypertension     Fibromyalgia     Heart murmur     High cholesterol     Leg pain     Low back pain     Lumbar pain     Lumbar stenosis with neurogenic claudication     Migraine     Numbness and tingling of foot     Obesity     Ruptured lumbar disc     Seasonal allergies     Sleep apnea     uses CPAP    Spondylolisthesis of lumbosacral region        Past Surgical History:   Procedure Laterality Date    AORTIC VALVE REPLACEMENT      x2    CERVICAL SPINE SURGERY      x3    COLONOSCOPY      KNEE ARTHROSCOPY W/ MENISCAL REPAIR Left     LAMINECTOMY Bilateral 11/15/2023    Procedure: LAMINECTOMY, SPINE;  Surgeon: Lexie Espinoza MD;  Location: [facility];  Service: Neurosurgery;  Laterality: Bilateral;  L3-4, L4-5    LAMINOTOMY Left 11/15/2023    Procedure: LAMINOTOMY;  Surgeon: Lexie Espinoza MD;  Location: [facility];  Service: Neurosurgery;  Laterality: Left;  L4-5    LUMBAR SPINE SURGERY      2015 & 2020    POSTERIOR LUMBAR INTERBODY FUSION (PLIF) WITH COMPUTER-ASSISTED NAVIGATION N/A 11/15/2023    Procedure: FUSION, SPINE, LUMBAR, PLIF, USING COMPUTER-ASSISTED NAVIGATION;  Surgeon: Lexie Espinoza MD;  Location: [facility];  Service: Neurosurgery;  Laterality: N/A;  posterolateral fusion at L3-4, L4-5    TONSILLECTOMY      TRANSFORAMINAL EPIDURAL INJECTION OF STEROID Right 10/05/2023    Procedure: INJECTION, STEROID, EPIDURAL, TRANSFORAMINAL APPROACH Right L4 L5;  Surgeon: Florence Mcgee MD;  Location: [facility];  Service: Pain Management;  Laterality: Right;  Rt L4 & L5    VASECTOMY      WRIST SURGERY  "Right        Family History   Problem Relation Age of Onset    Anesthesia problems Mother         "trouble waking up"    No Known Problems Father        Social History     Socioeconomic History    Marital status:    Tobacco Use    Smoking status: Never    Smokeless tobacco: Never   Substance and Sexual Activity    Alcohol use: Yes     Alcohol/week: 2.0 standard drinks of alcohol     Types: 2 Shots of liquor per week    Drug use: Never     Social Determinants of Health     Financial Resource Strain: Low Risk  (11/15/2023)    Overall Financial Resource Strain (CARDIA)     Difficulty of Paying Living Expenses: Not hard at all   Food Insecurity: No Food Insecurity (11/15/2023)    Hunger Vital Sign     Worried About Running Out of Food in the Last Year: Never true     Ran Out of Food in the Last Year: Never true   Transportation Needs: No Transportation Needs (11/15/2023)    PRAPARE - Transportation     Lack of Transportation (Medical): No     Lack of Transportation (Non-Medical): No   Physical Activity: Unknown (11/15/2023)    Exercise Vital Sign     Days of Exercise per Week: 0 days   Stress: No Stress Concern Present (11/15/2023)    [employer] - Occupational Stress Questionnaire     Feeling of Stress : Not at all   Social Connections: Unknown (11/15/2023)    Social Connection and Isolation Panel [NHANES]     Frequency of Communication with Friends and Family: More than three times a week     Frequency of Social Gatherings with Friends and Family: More than three times a week     Active Member of Clubs or Organizations: Yes     Attends Club or Organization Meetings: More than 4 times per year     Marital Status:    Housing Stability: Low Risk  (11/15/2023)    Housing Stability Vital Sign     Unable to Pay for Housing in the Last Year: No     Number of Places Lived in the Last Year: 1     Unstable Housing in the Last Year: No       Review of patient's allergies indicates: " "  Allergen Reactions    [id number] antimigraine agents Shortness Of Breath and Itching     "Itching of throat"        Current Outpatient Medications   Medication Instructions    aspirin (ECOTRIN) 325 mg, Oral, Daily    atorvastatin (LIPITOR) 40 mg, Oral, Daily    chlorpheniramine (CHLOR-TRIMETON) 4 mg, Oral, Nightly    CMPD testosterone proprionate 2% in vanicream Topical (Top), Daily, 200 mg/gm    co-enzyme Q-10 50 mg, Oral, Daily    cyclobenzaprine (FLEXERIL) 10 mg, Oral, 3 times daily PRN    diphenhydrAMINE (BENADRYL) 25 mg, Oral, Every 4 hours PRN    ezetimibe (ZETIA) 10 mg, Oral, Daily    fluticasone propionate (FLONASE) 50 mcg/actuation nasal spray 1 spray, Each Nostril, 2 times daily    furosemide (LASIX) 40 mg, Oral, Daily    INV potassium chloride SA (K-DUR,KLOR-CON) 20 mEq Tab No dose, route, or frequency recorded.    ipratropium (ATROVENT) 42 mcg (0.06 %) nasal spray 2 sprays, Nasal, 2 times daily    levocetirizine (XYZAL) 5 mg, Oral, Daily    LINZESS 72 mcg, Oral, Every morning    metoprolol succinate (TOPROL-XL) 25 mg, Oral, Daily    multivitamin-minerals-lutein (MULTIVITAMIN 50 PLUS) Tab 1 tablet, Oral, Daily    NURTEC 75 mg odt SMARTSI Tablet(s) Sublingual Every Other Day    omega 3-dha-epa-fish oil (FISH OIL) 100-160-1,000 mg Cap No dose, route, or frequency recorded.    oxyCODONE-acetaminophen (PERCOCET)  mg per tablet 1 tablet, Oral, Every 4 hours PRN    tadalafiL (CIALIS) 5 mg, Oral, Daily    tamsulosin (FLOMAX) 0.4 mg, Oral, Daily    traZODone (DESYREL) 75 mg, Oral, Nightly    WEGOVY 0.5 mg/0.5 mL PnIj Subcutaneous, Weekly          Review of Systems   Constitutional:  Negative for chills, fever and weight loss.   HENT:  Negative for congestion, hearing loss, nosebleeds and tinnitus.    Eyes:  Negative for blurred vision, double vision and photophobia.   Respiratory:  Negative for cough, shortness of breath and wheezing.    Cardiovascular:  Negative for chest pain, palpitations and " "leg swelling.   Gastrointestinal:  Negative for constipation, diarrhea, nausea and vomiting.   Genitourinary:  Negative for dysuria, frequency and urgency.   Musculoskeletal:  Positive for back pain (tightness and achiness). Negative for falls and neck pain.   Skin:  Negative for itching and rash.   Neurological:  Positive for tingling (left medial leg). Negative for dizziness, tremors, sensory change, speech change, seizures, loss of consciousness and headaches.   Psychiatric/Behavioral:  Negative for depression, hallucinations and memory loss. The patient is not nervous/anxious.          OBJECTIVE:     Physical Exam    Visit Vitals  BP (P) 120/79 (BP Location: Left arm, Patient Position: Sitting)   Pulse (P) 73   Temp (P) 97.9 °F (36.6 °C) (Oral)   Resp (P) 16   Ht (P) 5' 11" (1.803 m)   Wt (P) 112.9 kg (249 lb)   BMI (P) 34.73 kg/m²        General:  Pleasant, Well-nourished, Well-groomed.    Cardiovascular:  Heart has regular rate and rhythm.    Lungs:  Breathing is quiet, non-lablored    Musculoskeletal:  Incision: healing well, no significant drainage, no dehiscence, no significant erythema.  ROM is  evaluated given the patient's recent lumbar fusion    Neurological:  Muscle strength against resistance:   Right Left   Hip abduction 5/5 5/5   Hip adduction 5/5 5/5   Knee extension (L3) 5/5 5/5   Knee flexion (L4) 5/5 5/5   Dorsiflexion (L5) 5/5 5/5   EHL (L5) 5/5 5/5   Plantar flexion (S1) 5/5 5/5   Sensation is intact to primary modalities in lower extremities, except diminished in the left medial thigh and calf compared to the right.    Reflexes:   Right Left   Patellar (L4) 2+ 0   Achilles (S1) 2+ 2+   Negative Babinski, Clonus, Cruz, Tinel's, and Phalen's bilaterally.  Gait is normal  Coordination is normal.    Imaging:  All pertinent neuroimaging independently reviewed. Discussed these findings in detail with the patient.    ASSESSMENT:       ICD-10-CM ICD-9-CM   1. Status post lumbar spinal fusion  " Z98.1 V45.4       PLAN:     1. Status post lumbar spinal fusion  - Ambulatory referral/consult to Physical/Occupational Therapy; Future    Jose De La Vega returns today denying intolerable pain.  He continues to sparingly use Percocet and Flexeril which does provide some relief.  His incision has healed nicely and was without any signs of infection.  His staples were removed today without any issues.  He was advised to remain in his TLSO brace anytime the head of bed is greater than 30°.  We did discuss his 15 lb lifting restriction.  He was advised against any type of bending, stooping twisting or lifting activities.  He and his wife verbalized understanding at this time.  He was advised to continue to clean his incision daily and to avoid anytime of ointments or lotions to the area.  We will plan to follow up with the patient for a 6 week postoperative visit with updated x-rays for surveillance purposes prior to this visit.  He in his wife were advised to notify us with any regression of symptoms prior to that time.    Follow up in about 4 weeks (around 12/28/2023) for 6 weeks Post Op.    E/M Level Based On Time:   15 minutes spent on reviewing chart, which includes interpreting lab results and diagnostic tests.   15 minutes spent in the room with the patient performing a history and physical exam, counseling or educating the patient/caregiver, prescribing medications, ordering labwork/diagnostic tests, or placing referrals.   5 minutes spent collaborating plan of care with physician.   5 minutes spent documenting all relevant clinical informationin the electronic health record.     Total Time Spent: 40 minutes        MAXIMILIANO Sam    Disclaimer:  This note is prepared using voice recognition software and as such is likely to have errors despite attempts at proofreading. Please contact me for questions.

## 2023-11-18 PROCEDURE — 99024 POSTOP FOLLOW-UP VISIT: CPT | Mod: ,,, | Performed by: NEUROLOGICAL SURGERY

## 2023-11-18 PROCEDURE — 99024 PR POST-OP FOLLOW-UP VISIT: ICD-10-PCS | Mod: ,,, | Performed by: NEUROLOGICAL SURGERY

## 2023-11-18 PROCEDURE — 25000003 PHARM REV CODE 250: Performed by: PHYSICIAN ASSISTANT

## 2023-11-18 PROCEDURE — 11000001 HC ACUTE MED/SURG PRIVATE ROOM

## 2023-11-18 PROCEDURE — 25000003 PHARM REV CODE 250: Performed by: NEUROLOGICAL SURGERY

## 2023-11-18 PROCEDURE — 63600175 PHARM REV CODE 636 W HCPCS: Performed by: NEUROLOGICAL SURGERY

## 2023-11-18 PROCEDURE — 97535 SELF CARE MNGMENT TRAINING: CPT | Mod: CO

## 2023-11-18 RX ADMIN — DOCUSATE SODIUM 100 MG: 100 CAPSULE, LIQUID FILLED ORAL at 09:11

## 2023-11-18 RX ADMIN — METOPROLOL SUCCINATE 25 MG: 25 TABLET, EXTENDED RELEASE ORAL at 08:11

## 2023-11-18 RX ADMIN — TAMSULOSIN HYDROCHLORIDE 0.4 MG: 0.4 CAPSULE ORAL at 08:11

## 2023-11-18 RX ADMIN — CEFAZOLIN SODIUM 2 G: 2 SOLUTION INTRAVENOUS at 11:11

## 2023-11-18 RX ADMIN — POLYETHYLENE GLYCOL 3350 17 G: 17 POWDER, FOR SOLUTION ORAL at 08:11

## 2023-11-18 RX ADMIN — OXYCODONE HYDROCHLORIDE AND ACETAMINOPHEN 1 TABLET: 10; 325 TABLET ORAL at 09:11

## 2023-11-18 RX ADMIN — TRAZODONE HYDROCHLORIDE 75 MG: 50 TABLET ORAL at 09:11

## 2023-11-18 RX ADMIN — CHLORPHENIRAMINE MALEATE 4 MG: 4 TABLET ORAL at 09:11

## 2023-11-18 RX ADMIN — DOCUSATE SODIUM 100 MG: 100 CAPSULE, LIQUID FILLED ORAL at 08:11

## 2023-11-18 RX ADMIN — CEFAZOLIN SODIUM 2 G: 2 SOLUTION INTRAVENOUS at 07:11

## 2023-11-18 RX ADMIN — EZETIMIBE 10 MG: 10 TABLET ORAL at 08:11

## 2023-11-18 RX ADMIN — IPRATROPIUM BROMIDE 2 SPRAY: 42 SPRAY, METERED NASAL at 08:11

## 2023-11-18 RX ADMIN — FUROSEMIDE 40 MG: 40 TABLET ORAL at 08:11

## 2023-11-18 RX ADMIN — CEFAZOLIN SODIUM 2 G: 2 SOLUTION INTRAVENOUS at 03:11

## 2023-11-18 RX ADMIN — FLUTICASONE PROPIONATE 50 MCG: 50 SPRAY, METERED NASAL at 08:11

## 2023-11-18 RX ADMIN — OXYCODONE HYDROCHLORIDE AND ACETAMINOPHEN 1 TABLET: 10; 325 TABLET ORAL at 12:11

## 2023-11-18 RX ADMIN — OXYCODONE HYDROCHLORIDE AND ACETAMINOPHEN 1 TABLET: 10; 325 TABLET ORAL at 10:11

## 2023-11-18 RX ADMIN — MUPIROCIN: 20 OINTMENT TOPICAL at 08:11

## 2023-11-18 RX ADMIN — ATORVASTATIN CALCIUM 40 MG: 40 TABLET, FILM COATED ORAL at 08:11

## 2023-11-18 RX ADMIN — MUPIROCIN: 20 OINTMENT TOPICAL at 09:11

## 2023-11-18 NOTE — PROGRESS NOTES
Postop day 3.  Doing well.  Feels ready to go home soon, but we will need to have the drain removed   Drain output 190 cc over last 24 hours so we will keep it in   Patient wants to shower

## 2023-11-18 NOTE — PROGRESS NOTES
"Hospital Progress Note  [facility] Neurosurgery      Admit Date: 11/15/2023  Post-operative Day: 2 Days Post-Op  Hospital Day: 3    SUBJECTIVE:     POD #2 s/p redo lumbar surgery with L3-4, L4-5 laminectomy and posterolateral fusion    Interval History:  Mr. De La Vega is sitting in a reclining chair.  The patient reports that he is doing very well. Although the patient has moderate back pain, he no longer has bilateral leg pain with ambulation.  The patient has numbness in his left leg, which was present preoperatively after getting shingles.       Scheduled Meds:   atorvastatin  40 mg Oral Daily    ceFAZolin (ANCEF) IVPB  2 g Intravenous Q8H    chlorpheniramine  4 mg Oral QHS    CMPD Testosterone Cream 200 mg  200 mg Topical (Top) Daily    co-enzyme Q-10  50 mg Oral Daily    docusate sodium  100 mg Oral BID    ezetimibe  10 mg Oral Daily    fluticasone propionate  1 spray Each Nostril BID    furosemide  40 mg Oral Daily    ipratropium  2 spray Each Nostril BID    levocetirizine  5 mg Oral Daily    metoprolol succinate  25 mg Oral Daily    mupirocin   Nasal BID    polyethylene glycol  17 g Oral Daily    rimegepant  75 mg Sublingual Every other day    tadalafiL  5 mg Oral Daily    tamsulosin  0.4 mg Oral Daily    traZODone  75 mg Oral QHS     Continuous Infusions:  PRN Meds:acetaminophen, methocarbamoL, morphine, ondansetron, oxyCODONE-acetaminophen, prochlorperazine, senna-docusate 8.6-50 mg    Review of patient's allergies indicates:   Allergen Reactions    [id number] antimigraine agents Shortness Of Breath and Itching     "Itching of throat"       Past Medical History:   Diagnosis Date    Arthritis     Essential (primary) hypertension     Fibromyalgia     Heart murmur     High cholesterol     Leg pain     Low back pain     Lumbar pain     Lumbar stenosis with neurogenic claudication     Migraine     Numbness and tingling of foot     Obesity     Ruptured lumbar disc     Seasonal allergies     " Sleep apnea     uses CPAP    Spondylolisthesis of lumbosacral region      Past Surgical History:   Procedure Laterality Date    AORTIC VALVE REPLACEMENT      x2    CERVICAL SPINE SURGERY      x3    COLONOSCOPY      KNEE ARTHROSCOPY W/ MENISCAL REPAIR Left     LAMINECTOMY Bilateral 11/15/2023    Procedure: LAMINECTOMY, SPINE;  Surgeon: Lexie Espinoza MD;  Location: [facility];  Service: Neurosurgery;  Laterality: Bilateral;  L3-4, L4-5    LAMINOTOMY Left 11/15/2023    Procedure: LAMINOTOMY;  Surgeon: Lexie Espinoza MD;  Location: [facility] OR;  Service: Neurosurgery;  Laterality: Left;  L4-5    LUMBAR SPINE SURGERY      2015 & 2020    POSTERIOR LUMBAR INTERBODY FUSION (PLIF) WITH COMPUTER-ASSISTED NAVIGATION N/A 11/15/2023    Procedure: FUSION, SPINE, LUMBAR, PLIF, USING COMPUTER-ASSISTED NAVIGATION;  Surgeon: Lexie Espinoza MD;  Location: [facility];  Service: Neurosurgery;  Laterality: N/A;  posterolateral fusion at L3-4, L4-5    TONSILLECTOMY      TRANSFORAMINAL EPIDURAL INJECTION OF STEROID Right 10/05/2023    Procedure: INJECTION, STEROID, EPIDURAL, TRANSFORAMINAL APPROACH Right L4 L5;  Surgeon: Florence Mcgee MD;  Location: [facility] OR;  Service: Pain Management;  Laterality: Right;  Rt L4 & L5    VASECTOMY      WRIST SURGERY Right        OBJECTIVE:     Vital Signs (Most Recent)  Temp: 98.2 °F (36.8 °C) (11/17/23 1631)  Pulse: 70 (11/17/23 1631)  Resp: 20 (11/17/23 1649)  BP: 130/73 (11/17/23 1631)  SpO2: 98 % (11/17/23 1631)    Vital Signs Range (Last 24H):  Temp:  [97.9 °F (36.6 °C)-98.3 °F (36.8 °C)]   Pulse:  [62-76]   Resp:  [16-20]   BP: (108-137)/(67-82)   SpO2:  [97 %-99 %]     Drain: 540 cc/ 24 hrs, 40 cc in 8 hrs, serosanguinous      Physical Exam:  General Alert, cono acute distress     Motor Strength 5/5 in b LE         Sensory Normal sensation in R LE   Diffusely decreased sensation in L LE      Wound Dressing- clean, dry, and intact       Laboratory Review:    CBC without Differential:  Lab  "Results   Component Value Date    WBC 10.39 11/17/2023    HGB 12.4 (L) 11/17/2023    HCT 36.3 (L) 11/17/2023     11/17/2023    MCV 86.2 11/17/2023     Differential:   No results found for: "NEUTROABS", "LYMPHSABS", "BASOSABS", "MONOSABS"    Basic Metabolic Panel:  Lab Results   Component Value Date     11/17/2023    K 4.1 11/17/2023    BUN 12.2 11/17/2023     Coagulation Studies:  Lab Results   Component Value Date    INR 1.0 10/31/2023    INR 1.6 (H) 07/07/2021    INR 1.0 07/02/2021    PROTIME 13.1 10/31/2023    PROTIME 19.2 (H) 07/07/2021    PROTIME 13.1 07/02/2021     Lab Results   Component Value Date    PTT 25.1 10/31/2023     Urinalysis:  Lab Results   Component Value Date    LEUKOCYTESUR Negative 10/31/2023    UROBILINOGEN Normal 10/31/2023        ASSESSMENT/PLAN:     Mr. De aL Vega is a 68 y.o. male who has a past medical history significant for hypertension, aortic valve replacement, and migraine headaches.  He is s/p 3 cervical spine surgeries 30 years ago in [place], [place] involving anterior fusions from C3-7.  The patient has also had 3 lumbar surgeries in the past including a L5-S1 ALIF (Globus) on 09/05/2014 by Dr. Howell.   In addition, he is s/p bilateral L4-5 laminotomies on 03/06/2020 with a SI joint fusion by Dr. Calix.  Mr. De La Vega is POD #2 s/p redo lumbar surgery with L3-4, L4-5 laminectomy and posterolateral fusion.  He is doing very well postoperatively with normal strength in his bilateral lower extremities and continued numbness in his left leg, which was present preoperatively.      Plan:     1.  Continue Q4 hr neuro checks.    2.  Continue hemovac drain.    3.  The patient is encouraged to continue mobilizing with physical therapy and occupational therapy.  He is to wear his [place] LSO brace whenever his head of bed is greater than 30°.     4.  Mr. De La Vega is doing very well postoperatively.  I anticipate that he will be able to be eventually discharged home with " outpatient physical therapy services.       5.  The patient is scheduled to follow up in the neurosurgery clinic with MARYLU Cavanaugh on Thursday, 11/30/2023 at 11:30 AM.     6.  Dr. Calix will be cross covering this patient over the weekend until I return on Monday, 11/20/2023.          Lexie Espinoza MD  Neurosurgery

## 2023-11-18 NOTE — PT/OT/SLP PROGRESS
Occupational Therapy   Treatment    Name: Jose De La Vega  MRN: [id number]  Admitting Diagnosis:  Lumbar stenosis with neurogenic claudication  3 Days Post-Op    Recommendations:     Recommended therapy intensity at discharge: Low Intensity Therapy   Discharge Equipment Recommendations:     Barriers to discharge:       Assessment:     Jose De La Vega is a 68 y.o. male with a medical diagnosis of Lumbar stenosis with neurogenic claudication.  Performance deficits affecting function are impaired self care skills, impaired functional mobility, pain, weakness, impaired balance, orthopedic precautions.     Rehab Prognosis:  Fair; patient would benefit from acute skilled OT services to address these deficits and reach maximum level of function.       Plan:     Patient to be seen 5 x/week to address the above listed problems via self-care/home management, therapeutic activities, therapeutic exercises  Plan of Care Expires: 12/15/23  Plan of Care Reviewed with:      Subjective     Pain/Comfort:       Objective:     Communicated with: RN prior to session.  Patient found up in chair with   upon OT entry to room.    General Precautions: Standard,      Orthopedic Precautions:spinal precautions  Braces:       Occupational Performance:     Activities of Daily Living:  Lower Body Dressing: modified independence patient educated on and performed LB dressing to law/doff socks using AD    Patient Education:  Patient provided with verbal education education regarding OT role/goals/POC.  Understanding was verbalized.      Patient left up in chair with all lines intact and call button in reach    GOALS:   Multidisciplinary Problems       Occupational Therapy Goals          Problem: Occupational Therapy    Goal Priority Disciplines Outcome Interventions   Occupational Therapy Goal     OT, PT/OT Ongoing, Progressing    Description: Goals to be met by: in 1 month     Patient will increase functional independence with ADLs by  performing:    UE Dressing with Modified [place].  LE Dressing with Modified [place] using AD as needed.  Grooming while standing at sink with Modified [place].  Toileting from toilet with Modified [place] for hygiene and clothing management.   Toilet transfer to toilet with Modified [place].                         Time Tracking:     OT Date of Treatment: 11/18/23  OT Start Time: 1011  OT Stop Time: 1023  OT Total Time (min): 12 min    Billable Minutes:Self Care/Home Management 1    OT/TAVARES: TAVARES     Number of TAVARES visits since last OT visit: 1    11/18/2023

## 2023-11-19 VITALS
BODY MASS INDEX: 35.42 KG/M2 | HEIGHT: 71 IN | OXYGEN SATURATION: 94 % | TEMPERATURE: 98 F | HEART RATE: 82 BPM | DIASTOLIC BLOOD PRESSURE: 79 MMHG | SYSTOLIC BLOOD PRESSURE: 112 MMHG | WEIGHT: 253 LBS | RESPIRATION RATE: 18 BRPM

## 2023-11-19 PROCEDURE — 99024 PR POST-OP FOLLOW-UP VISIT: ICD-10-PCS | Mod: ,,, | Performed by: NEUROLOGICAL SURGERY

## 2023-11-19 PROCEDURE — 25000003 PHARM REV CODE 250: Performed by: PHYSICIAN ASSISTANT

## 2023-11-19 PROCEDURE — 25000003 PHARM REV CODE 250: Performed by: NEUROLOGICAL SURGERY

## 2023-11-19 PROCEDURE — 99024 POSTOP FOLLOW-UP VISIT: CPT | Mod: ,,, | Performed by: NEUROLOGICAL SURGERY

## 2023-11-19 PROCEDURE — 63600175 PHARM REV CODE 636 W HCPCS: Performed by: NEUROLOGICAL SURGERY

## 2023-11-19 RX ORDER — OXYCODONE AND ACETAMINOPHEN 10; 325 MG/1; MG/1
1 TABLET ORAL EVERY 4 HOURS PRN
Qty: 42 TABLET | Refills: 0 | Status: SHIPPED | OUTPATIENT
Start: 2023-11-19 | End: 2023-11-19

## 2023-11-19 RX ORDER — OXYCODONE AND ACETAMINOPHEN 10; 325 MG/1; MG/1
1 TABLET ORAL EVERY 4 HOURS PRN
Qty: 42 TABLET | Refills: 0 | Status: SHIPPED | OUTPATIENT
Start: 2023-11-19 | End: 2023-11-19 | Stop reason: SDUPTHER

## 2023-11-19 RX ORDER — DIPHENHYDRAMINE HCL 25 MG
25 CAPSULE ORAL EVERY 4 HOURS PRN
Refills: 0
Start: 2023-11-19 | End: 2024-02-08

## 2023-11-19 RX ORDER — OXYCODONE AND ACETAMINOPHEN 10; 325 MG/1; MG/1
1 TABLET ORAL EVERY 4 HOURS PRN
Qty: 42 TABLET | Refills: 0 | Status: SHIPPED | OUTPATIENT
Start: 2023-11-19 | End: 2023-11-20 | Stop reason: SDUPTHER

## 2023-11-19 RX ORDER — DIPHENHYDRAMINE HCL 25 MG
25 CAPSULE ORAL EVERY 4 HOURS PRN
Status: DISCONTINUED | OUTPATIENT
Start: 2023-11-19 | End: 2023-11-19 | Stop reason: HOSPADM

## 2023-11-19 RX ADMIN — POLYETHYLENE GLYCOL 3350 17 G: 17 POWDER, FOR SOLUTION ORAL at 09:11

## 2023-11-19 RX ADMIN — FLUTICASONE PROPIONATE 50 MCG: 50 SPRAY, METERED NASAL at 09:11

## 2023-11-19 RX ADMIN — METOPROLOL SUCCINATE 25 MG: 25 TABLET, EXTENDED RELEASE ORAL at 09:11

## 2023-11-19 RX ADMIN — IPRATROPIUM BROMIDE 2 SPRAY: 42 SPRAY, METERED NASAL at 09:11

## 2023-11-19 RX ADMIN — EZETIMIBE 10 MG: 10 TABLET ORAL at 09:11

## 2023-11-19 RX ADMIN — FUROSEMIDE 40 MG: 40 TABLET ORAL at 09:11

## 2023-11-19 RX ADMIN — MUPIROCIN: 20 OINTMENT TOPICAL at 09:11

## 2023-11-19 RX ADMIN — TAMSULOSIN HYDROCHLORIDE 0.4 MG: 0.4 CAPSULE ORAL at 09:11

## 2023-11-19 RX ADMIN — OXYCODONE HYDROCHLORIDE AND ACETAMINOPHEN 1 TABLET: 10; 325 TABLET ORAL at 12:11

## 2023-11-19 RX ADMIN — CEFAZOLIN SODIUM 2 G: 2 SOLUTION INTRAVENOUS at 07:11

## 2023-11-19 RX ADMIN — ATORVASTATIN CALCIUM 40 MG: 40 TABLET, FILM COATED ORAL at 09:11

## 2023-11-19 RX ADMIN — DOCUSATE SODIUM 100 MG: 100 CAPSULE, LIQUID FILLED ORAL at 09:11

## 2023-11-19 RX ADMIN — OXYCODONE HYDROCHLORIDE AND ACETAMINOPHEN 1 TABLET: 10; 325 TABLET ORAL at 04:11

## 2023-11-19 NOTE — PROGRESS NOTES
Drain output minimal-we will remove  Otherwise, doing great  Incision clean and dry  Ready for discharge

## 2023-11-19 NOTE — PLAN OF CARE
Problem: Adult Inpatient Plan of Care  Goal: Plan of Care Review  Outcome: Ongoing, Progressing  Goal: Patient-Specific Goal (Individualized)  Outcome: Ongoing, Progressing  Goal: Absence of Hospital-Acquired Illness or Injury  Outcome: Ongoing, Progressing     Problem: Infection  Goal: Absence of Infection Signs and Symptoms  Outcome: Ongoing, Progressing     Problem: Fall Injury Risk  Goal: Absence of Fall and Fall-Related Injury  Outcome: Ongoing, Progressing

## 2023-11-20 ENCOUNTER — TELEPHONE (OUTPATIENT)
Dept: NEUROSURGERY | Facility: CLINIC | Age: 68
End: 2023-11-20
Payer: COMMERCIAL

## 2023-11-20 ENCOUNTER — PATIENT MESSAGE (OUTPATIENT)
Dept: NEUROSURGERY | Facility: CLINIC | Age: 68
End: 2023-11-20
Payer: COMMERCIAL

## 2023-11-20 DIAGNOSIS — G89.18 ACUTE POST-OPERATIVE PAIN: ICD-10-CM

## 2023-11-20 DIAGNOSIS — M48.062 LUMBAR STENOSIS WITH NEUROGENIC CLAUDICATION: Primary | ICD-10-CM

## 2023-11-20 RX ORDER — OXYCODONE AND ACETAMINOPHEN 10; 325 MG/1; MG/1
1 TABLET ORAL EVERY 4 HOURS PRN
Qty: 42 TABLET | Refills: 0 | Status: SHIPPED | OUTPATIENT
Start: 2023-11-20 | End: 2023-12-18 | Stop reason: SDUPTHER

## 2023-11-20 NOTE — TELEPHONE ENCOUNTER
I called the patient to advise him we do have the letter he is requesting and asked how he would like for me to get it to him. He requested I put it in the mail for him.

## 2023-11-20 NOTE — DISCHARGE SUMMARY
[facility]  Discharge Summary  Neurosurgery    Admit Date: 11/15/2023    Discharge Date and Time: 11/19/2023  5:30 PM    Attending Physician: Lexie Espinoza MD    Discharge Physician: Lexie Espinoza MD    Reason for Admission:   Mr. De La Vega is a 68 y.o. male who has a past medical history significant for hypertension, aortic valve replacement, and migraine headaches.  He is s/p 3 cervical spine surgeries 30 years ago in [place], [place] involving anterior fusions from C3-7.  The patient has also had 3 lumbar surgeries in the past including a L5-S1 ALIF (Globus) on 09/05/2014 by Dr. Howell.   In addition, he is s/p bilateral L4-5 laminotomies on 03/06/2020 with a SI joint fusion by Dr. Calix.  Mr. De La Vega presented as a follow up patient in the neurosurgery clinic for chronic low back pain with radiation into his right-greater-than-left lateral legs with walking.  These symptoms have progressed in the last year.  He had a normal motor exam with chronic numbness in his bilateral medial thighs and bilateral lower extremities below the knees distally to his feet with significant numbness in his bilateral great toes. There were no signs of myelopathy.      I reviewed pertinent imaging studies with the patient.  Lumbar spine x-rays demonstrated minimal retrolisthesis of L3 on L4 and Grade I anterior spondylolisthesis of L4 on L5.  There were postoperative changes s/p a L5-S1 ALIF with hardware in place.  There was movement at L4-5 with anterolisthesis measuring 6 mm in the neutral view, 9 mm with flexion, and 5 mm with extension.  A MRI lumbar spine without gadolinium demonstrated postoperative changes s/p a L5-S1 ALIF.  At L4-5, there was a left disc herniation with mild stenosis and severe left neuroforaminal narrowing.     I discussed with Mr. De La Vega that although his low back pain and bilateral leg pain had partially improved with optimization of medical management, he was symptomatic  from L4-5 anterior spondylolisthesis with instability as well as stenosis.  This L4-5 level was adjacent to his previous L5-S1 anterior fusion.  The patient was deemed a candidate for surgery, specifically a redo lumbar surgery involving left L4-5 diskectomy, L4-5 laminectomy, and posterolateral fusion at L3-4, L4-5.  I reviewed the risks, benefits, and alternatives of this surgery. Mr. De La Vega agreed to proceed.  All questions were answered to his satisfaction.      Procedures Performed:   Redo lumbar surgery with L3-4, L4-5 laminectomy and posterolateral fusion, 11/15/2023- Dr. YULIYA Espinoza    [facility] Course: The patient underwent a redo lumbar surgery with L3-4, L4-5 laminectomy and posterolateral fusion on 11/15/2023.  After surgery, the patient was extubated and taken to the PACU in stable condition.  After observation in recovery, the patient was transferred to the neurosurgical floor.      Mr. De La Vega was issued an [place] LSO brace, which he wore whenever his head of bed was greater than 30°.  The patient mobilized with physical therapy and occupational therapy.  Postoperatively, he had significant improvement in his bilateral leg pain and numbness.  On exam, the patient had normal strength with chronic numbness in his left leg ever since having shingles.    He had a Hemovac in place postoperatively, which was removed on POD #4 on 11/19/2023.  The patient was discharged to home in good condition.  He was issued a walker for ambulation.     At time of discharge the patient was neurologically stable, was afebrile, and vital signs were stable.  The patient was tolerating a diet and voiding without difficulty.  The patient is being discharged home.  Discharge instructions were verbally discussed with the patient, including wound care and follow up appointments.  The patient was also given a discharge instruction sheet explaining the aforementioned discussion.  The patient verbalized understanding of instructions  and agreed to the plan.      Consults: none    Final Diagnoses:  Grade I retrolisthesis of L3 on L4, Grade I anterior spondylolisthesis of L4 on L5 with instability, previous lumbar surgery, L4-5 stenosis with left neuroforaminal narrowing     Discharged Condition: good   Principal Problem: Lumbar stenosis with neurogenic claudication   Secondary Diagnoses:  Grade I retrolisthesis of L3 on L4, Grade I anterior spondylolisthesis of L4 on L5 with instability, previous lumbar surgery, L4-5 stenosis with left neuroforaminal narrowing  , hypertension, migraine headaches    Disposition: Home or Self Care    Diet: Regular as tolerated     Activity: see instructsions    Follow Up/Patient Instructions:   The patient is scheduled to follow up in the neurosurgery clinic with MARYLU Cavanaugh on Thursday, 11/30/2023 at 11:30 AM.      See patient instruction sheet for further details.      Medications:  Reconciled Home Medications:      Medication List        START taking these medications      diphenhydrAMINE 25 mg capsule  Commonly known as: BENADRYL  Take 1 capsule (25 mg total) by mouth every 4 (four) hours as needed for Itching.     oxyCODONE-acetaminophen  mg per tablet  Commonly known as: PERCOCET  Take 1 tablet by mouth every 4 (four) hours as needed for Pain.            CONTINUE taking these medications      aspirin 325 MG EC tablet  Commonly known as: ECOTRIN  Take 325 mg by mouth once daily.     atorvastatin 40 MG tablet  Commonly known as: LIPITOR  Take 40 mg by mouth once daily.     chlorpheniramine 4 mg tablet  Commonly known as: CHLOR-TRIMETON  Take 4 mg by mouth every evening.     co-enzyme Q-10 50 mg capsule  Take 50 mg by mouth once daily.     cyclobenzaprine 10 MG tablet  Commonly known as: FLEXERIL  Take 1 tablet (10 mg total) by mouth 3 (three) times daily as needed for Muscle spasms.     ezetimibe 10 mg tablet  Commonly known as: ZETIA  Take 10 mg by mouth once daily.     Fish OiL 100-160-1,000 mg  Cap  Generic drug: omega 3-dha-epa-fish oil     FLOMAX 0.4 mg Cap  Generic drug: tamsulosin  Take 0.4 mg by mouth once daily.     fluticasone propionate 50 mcg/actuation nasal spray  Commonly known as: FLONASE  1 spray by Each Nostril route 2 (two) times daily.     furosemide 40 MG tablet  Commonly known as: LASIX  Take 40 mg by mouth once daily.     INV potassium chloride SA 20 mEq Tab  Commonly known as: K-DUR,KLOR-CON     ipratropium 42 mcg (0.06 %) nasal spray  Commonly known as: ATROVENT  2 sprays by Nasal route 2 (two) times daily.     metoprolol succinate 25 MG 24 hr tablet  Commonly known as: TOPROL-XL  Take 25 mg by mouth once daily.     MULTIVITAMIN 50 PLUS Tab  Generic drug: multivitamin-minerals-lutein  Take 1 tablet by mouth once daily.     NURTEC 75 mg odt  Generic drug: rimegepant  SMARTSI Tablet(s) Sublingual Every Other Day     tadalafiL 5 MG tablet  Commonly known as: CIALIS  Take 5 mg by mouth once daily.     * CMPD TESTOSTERONE CREAM  Apply topically once daily. 200 mg/gm     * testosterone     traZODone 150 MG tablet  Commonly known as: DESYREL  Take 75 mg by mouth every evening.     WEGOVY 0.5 mg/0.5 mL Pnij  Generic drug: semaglutide (weight loss)  Inject into the skin once a week.     XYZAL 5 MG tablet  Generic drug: levocetirizine  Take 5 mg by mouth once daily.           * This list has 2 medication(s) that are the same as other medications prescribed for you. Read the directions carefully, and ask your doctor or other care provider to review them with you.                STOP taking these medications      HYDROcodone-acetaminophen 5-325 mg per tablet  Commonly known as: NORCO     meloxicam 7.5 MG tablet  Commonly known as: MOBIC              POSTERIOR THORACO-LUMBAR/ LUMBAR FUSION  DISCHARGE INSTRUCTIONS      1.  Wear your TLSO Brace when sitting upright and when you are out of bed.    Your brace will likely be discontinued after 3 months.      2.   Keep your incision clean and dry.     Your sutures or staples will be removed at your first postoperative follow-up appointment in approximately 14 days.   Place clean gauze and tape over your wound daily until your sutures or staples are removed.  Wait at least 72 hours from the time of your surgery to take a shower.  After showering, pat your incision dry and replace the wound dressing.  Do not immerse your incision in water for 4-6 weeks (e.g. bath tub, hot tub, swimming pool).      3.  Activity restrictions:  No lifting greater than 15 pounds for 3 months.  No bending, stooping, or twisting.  Avoid sitting in one position for over 30 minutes at a time.  No impact exercise or contact sports for at least 3 months.  No driving for at least 2 weeks.  To resume driving short distances (<30 minutes), you must be off of your narcotic medications and be able to comfortably brake suddenly, should the need arise.    Get up and walk.      4.  Contact your Neurosurgeon if the following occurs:  Signs and symptoms of infection, including fever above 101.5 degrees Fahrenheit and/or chills.  Redness, swelling, warmth, or drainage from the incision.  Any lasting changes in sensation, numbness, and/or tingling.  Increased weakness or increased pain.  Swelling of the foot and/or lower leg with calf pain.    Neurosurgery has office hours Monday through Friday, 8:00 AM to 4:00 PM except for holidays. There is an answering service available during non-office hours, with 24 hours neurosurgery coverage.  Report to the Emergency Department if you need immediate medical assistance.      Because you have had a fusion, you are not to take steroidal medications or non-steroidal anti-inflammatory (NSAID) medications such as Motrin/ Ibuprofen or Naprosyn/ Naproxen unless agreed upon with your neurosurgeon.      Please contact Dr. Espinoza's office for any questions or concerns.  Typically, your first follow-up appointment after a posterior thoraco-lumbar/ lumbar fusion surgery is  approximately 14 days from the date of your operation.  At this time, sutures or staples will be removed.  For your second postoperative appointment in 4-6 weeks, an x-ray of your lumbar spine will be arranged in Radiology before your neurosurgery appointment.

## 2023-11-20 NOTE — LETTER
[facility] Neurosurgery  [address] [place] [address] [address], [address]  [place] [address]  Phone: [phone] November 20, 2023    Jose De La Vega  [address] [place] [address] [address] [place] [id number]      To Whom It May Concern:    Jose De La Vega is unable to participate in jury duty due to his recent lumbar surgery on 11/15/2023. He is currently unable to sit or stand for prolonged periods of time.    If you have any questions or concerns, please feel free to call my office.    Sincerely,        Afshan Longo, MAXIMILIANO

## 2023-11-30 ENCOUNTER — OFFICE VISIT (OUTPATIENT)
Dept: NEUROSURGERY | Facility: CLINIC | Age: 68
End: 2023-11-30
Payer: COMMERCIAL

## 2023-11-30 DIAGNOSIS — Z98.1 STATUS POST LUMBAR SPINAL FUSION: Primary | ICD-10-CM

## 2023-11-30 PROCEDURE — 1159F PR MEDICATION LIST DOCUMENTED IN MEDICAL RECORD: ICD-10-PCS | Mod: CPTII,,, | Performed by: NURSE PRACTITIONER

## 2023-11-30 PROCEDURE — 1101F PR PT FALLS ASSESS DOC 0-1 FALLS W/OUT INJ PAST YR: ICD-10-PCS | Mod: CPTII,,, | Performed by: NURSE PRACTITIONER

## 2023-11-30 PROCEDURE — 1160F PR REVIEW ALL MEDS BY PRESCRIBER/CLIN PHARMACIST DOCUMENTED: ICD-10-PCS | Mod: CPTII,,, | Performed by: NURSE PRACTITIONER

## 2023-11-30 PROCEDURE — 3288F FALL RISK ASSESSMENT DOCD: CPT | Mod: CPTII,,, | Performed by: NURSE PRACTITIONER

## 2023-11-30 PROCEDURE — 1101F PT FALLS ASSESS-DOCD LE1/YR: CPT | Mod: CPTII,,, | Performed by: NURSE PRACTITIONER

## 2023-11-30 PROCEDURE — 99024 PR POST-OP FOLLOW-UP VISIT: ICD-10-PCS | Mod: ,,, | Performed by: NURSE PRACTITIONER

## 2023-11-30 PROCEDURE — 1160F RVW MEDS BY RX/DR IN RCRD: CPT | Mod: CPTII,,, | Performed by: NURSE PRACTITIONER

## 2023-11-30 PROCEDURE — 99024 POSTOP FOLLOW-UP VISIT: CPT | Mod: ,,, | Performed by: NURSE PRACTITIONER

## 2023-11-30 PROCEDURE — 3288F PR FALLS RISK ASSESSMENT DOCUMENTED: ICD-10-PCS | Mod: CPTII,,, | Performed by: NURSE PRACTITIONER

## 2023-11-30 PROCEDURE — 1159F MED LIST DOCD IN RCRD: CPT | Mod: CPTII,,, | Performed by: NURSE PRACTITIONER

## 2023-11-30 RX ORDER — LINACLOTIDE 72 UG/1
72 CAPSULE, GELATIN COATED ORAL EVERY MORNING
COMMUNITY
Start: 2023-11-29 | End: 2023-12-28

## 2023-12-18 DIAGNOSIS — M48.062 LUMBAR STENOSIS WITH NEUROGENIC CLAUDICATION: ICD-10-CM

## 2023-12-18 DIAGNOSIS — G89.18 ACUTE POST-OPERATIVE PAIN: ICD-10-CM

## 2023-12-19 RX ORDER — OXYCODONE AND ACETAMINOPHEN 10; 325 MG/1; MG/1
1 TABLET ORAL EVERY 12 HOURS PRN
Qty: 14 TABLET | Refills: 0 | Status: SHIPPED | OUTPATIENT
Start: 2023-12-19

## 2023-12-19 NOTE — TELEPHONE ENCOUNTER
I received a refill request from the patient for Percocet.  I called this afternoon to discuss with him.  He is s/p redo lumbar surgery involving posterior lateral fusion with instrumentation from L3-4 to L4-5 on 11/15/23.  He continues with pain in the left lower back at the belt line.  He no longer has pain in the legs.  He says he is still taking the Percocet on Sundays and occasionally at bedtime.  He was last given Percocet 10-325mg 1q4h prn #42, 7 day supply on 11/20 after discharge from the hospital.  He was discharged on Sunday, 11/19, but had difficulty filling d/t two of the pharmacies being closed and two being out of stock.  The prescription was resent to [facility] in [place] the following day when he called to let us know.   shows that he filled another prescription for Percocet 10-325mg 1q6h prn #28 on 11/20 also, that was written by you on 11/17.  It did not show up on the  when the patient called on 11/20 because he had not picked up the 11/17 prescription yet.  I don't think the patient was aware that the prescription had been sent to [facility] on 11/17 when he called on 11/20.  I did speak with the pharmacy regarding this as well.  He was not completely out of medication when I spoke with him today, said he had a few left.  I did explain that you typically do not prescribe pain medication this far out from surgery (he will be 5wks po tomorrow, 12/20) and that he may need to discuss the refill with his PCP, but I would send you a message to double check.  I decreased the requested Percocet to BID #14 before routing to you.

## 2023-12-19 NOTE — PROGRESS NOTES
[facility]  History & Physical  Neurosurgery      Jose De La Vega   [id number]   1955     SUBJECTIVE:     CHIEF COMPLAINT:  6 week postoperative visit    HPI:  Jose De La Vega is a 68 y.o. male who presents for a 6 week post-operative follow-up.  He is s/p redo lumbar surgery involving posterior lateral fusion with instrumentation from L3-4 to L4-5 by Dr. Espinoza on 11/15/2023.  The patient had an uneventful recovery from surgery and was discharged home on 11/19/2023.    Prior to surgical intervention the patient reported lower back pain radiating into the right-greater-than-left lateral hip and lateral thigh.  He also describes decreased sensation along the anterior ankle and dorsal feet.  Described a cramping sensation in the proximal calves bilaterally as well as a heaviness intermittently into the lower extremities.  He reported his right foot would catch on the floor when he felt the heaviness in his legs.  He states increased standing would aggravate his symptoms significantly.  The patient has a significant history of L5-S1 ALIF with Dr. Howell on 9/5/2014.  Also underwent a bilateral L4-5 decompression and right SI joint fusion with Dr. Calix on 3/6/2020.  He had seen some relief following this procedure for approximately 9 months although unfortunately his symptoms recurred and became more problematic over the summer.    The patient returns today reporting the symptoms that are better since surgery.  The patient is complaining of minimal muscular stiffness left lower back and buttock.  He feels this is manageable at this time.  He plans to be discharged from physical therapy this week.  He continues to have some numbness to the left medial thigh and calf which he states has been present following an episode of shingles several years ago.  He denies any new onset of weakness or numbness.  The patient rates the pain 2/10 on the pain scale today. The patient denies disturbances in  "bowel or bladder function.  There is no difficulty with balance. He denies any fevers. He denies redness, warmth, drainage, swelling or tenderness to the surgical site. He states he has remained compliant with his TLSO brace.    Past Medical History:   Diagnosis Date    Arthritis     Essential (primary) hypertension     Fibromyalgia     Heart murmur     High cholesterol     Leg pain     Low back pain     Lumbar pain     Lumbar stenosis with neurogenic claudication     Migraine     Numbness and tingling of foot     Obesity     Ruptured lumbar disc     Seasonal allergies     Sleep apnea     uses CPAP    Spondylolisthesis of lumbosacral region        Past Surgical History:   Procedure Laterality Date    AORTIC VALVE REPLACEMENT      x2    CERVICAL SPINE SURGERY      x3    COLONOSCOPY      KNEE ARTHROSCOPY W/ MENISCAL REPAIR Left     LAMINECTOMY Bilateral 11/15/2023    Procedure: LAMINECTOMY, SPINE;  Surgeon: Lexie Espinoza MD;  Location: [facility];  Service: Neurosurgery;  Laterality: Bilateral;  L3-4, L4-5    LAMINOTOMY Left 11/15/2023    Procedure: LAMINOTOMY;  Surgeon: Lexie Espinoza MD;  Location: [facility];  Service: Neurosurgery;  Laterality: Left;  L4-5    LUMBAR SPINE SURGERY      2015 & 2020    POSTERIOR LUMBAR INTERBODY FUSION (PLIF) WITH COMPUTER-ASSISTED NAVIGATION N/A 11/15/2023    Procedure: FUSION, SPINE, LUMBAR, PLIF, USING COMPUTER-ASSISTED NAVIGATION;  Surgeon: Lexie Espinoza MD;  Location: [facility];  Service: Neurosurgery;  Laterality: N/A;  posterolateral fusion at L3-4, L4-5    TONSILLECTOMY      TRANSFORAMINAL EPIDURAL INJECTION OF STEROID Right 10/05/2023    Procedure: INJECTION, STEROID, EPIDURAL, TRANSFORAMINAL APPROACH Right L4 L5;  Surgeon: Florence Mcgee MD;  Location: [facility];  Service: Pain Management;  Laterality: Right;  Rt L4 & L5    VASECTOMY      WRIST SURGERY Right        Family History   Problem Relation Age of Onset    Anesthesia problems Mother         "trouble waking " "up"    No Known Problems Father        Social History     Socioeconomic History    Marital status:    Tobacco Use    Smoking status: Never    Smokeless tobacco: Never   Substance and Sexual Activity    Alcohol use: Yes     Alcohol/week: 2.0 standard drinks of alcohol     Types: 2 Shots of liquor per week    Drug use: Never     Social Determinants of Health     Financial Resource Strain: Low Risk  (11/15/2023)    Overall Financial Resource Strain (CARDIA)     Difficulty of Paying Living Expenses: Not hard at all   Food Insecurity: No Food Insecurity (11/15/2023)    Hunger Vital Sign     Worried About Running Out of Food in the Last Year: Never true     Ran Out of Food in the Last Year: Never true   Transportation Needs: No Transportation Needs (11/15/2023)    PRAPARE - Transportation     Lack of Transportation (Medical): No     Lack of Transportation (Non-Medical): No   Physical Activity: Unknown (11/15/2023)    Exercise Vital Sign     Days of Exercise per Week: 0 days   Stress: No Stress Concern Present (11/15/2023)    [employer] - Occupational Stress Questionnaire     Feeling of Stress : Not at all   Social Connections: Unknown (11/15/2023)    Social Connection and Isolation Panel [NHANES]     Frequency of Communication with Friends and Family: More than three times a week     Frequency of Social Gatherings with Friends and Family: More than three times a week     Active Member of Clubs or Organizations: Yes     Attends Club or Organization Meetings: More than 4 times per year     Marital Status:    Housing Stability: Low Risk  (11/15/2023)    Housing Stability Vital Sign     Unable to Pay for Housing in the Last Year: No     Number of Places Lived in the Last Year: 1     Unstable Housing in the Last Year: No       Review of patient's allergies indicates:   Allergen Reactions    [id number] antimigraine agents Shortness Of Breath and Itching     "Itching of throat"    "     Current Outpatient Medications   Medication Instructions    aspirin (ECOTRIN) 325 mg, Oral, Daily    atorvastatin (LIPITOR) 40 mg, Oral, Daily    chlorpheniramine (CHLOR-TRIMETON) 4 mg, Oral, Nightly    CMPD testosterone proprionate 2% in vanicream Topical (Top), Daily, 200 mg/gm    co-enzyme Q-10 50 mg, Oral, Daily    cyclobenzaprine (FLEXERIL) 10 mg, Oral, 3 times daily PRN    diphenhydrAMINE (BENADRYL) 25 mg, Oral, Every 4 hours PRN    ezetimibe (ZETIA) 10 mg, Oral, Daily    fluticasone propionate (FLONASE) 50 mcg/actuation nasal spray 1 spray, Each Nostril, 2 times daily    furosemide (LASIX) 40 mg, Oral, Daily    INV potassium chloride SA (K-DUR,KLOR-CON) 20 mEq Tab No dose, route, or frequency recorded.    ipratropium (ATROVENT) 42 mcg (0.06 %) nasal spray 2 sprays, Nasal, 2 times daily    levocetirizine (XYZAL) 5 mg, Oral, Daily    metoprolol succinate (TOPROL-XL) 25 mg, Oral, Daily    multivitamin-minerals-lutein (MULTIVITAMIN 50 PLUS) Tab 1 tablet, Oral, Daily    NURTEC 75 mg odt SMARTSI Tablet(s) Sublingual Every Other Day    omega 3-dha-epa-fish oil (FISH OIL) 100-160-1,000 mg Cap No dose, route, or frequency recorded.    oxyCODONE-acetaminophen (PERCOCET)  mg per tablet 1 tablet, Oral, Every 12 hours PRN    tadalafiL (CIALIS) 5 mg, Oral, Daily    tamsulosin (FLOMAX) 0.4 mg, Oral, Daily    traZODone (DESYREL) 75 mg, Oral, Nightly    WEGOVY 0.5 mg/0.5 mL PnIj Subcutaneous, Weekly          Review of Systems   Constitutional:  Negative for chills, fever and weight loss.   HENT:  Negative for congestion, hearing loss, nosebleeds and tinnitus.    Eyes:  Negative for blurred vision, double vision and photophobia.   Respiratory:  Negative for cough, shortness of breath and wheezing.    Cardiovascular:  Negative for chest pain, palpitations and leg swelling.   Gastrointestinal:  Negative for constipation, diarrhea, nausea and vomiting.   Genitourinary:  Negative for dysuria, frequency and urgency.  "  Musculoskeletal:  Positive for back pain (tightness in the left lower back and buttock). Negative for falls and neck pain.   Skin:  Negative for itching and rash.   Neurological:  Positive for tingling (left medial leg). Negative for dizziness, tremors, sensory change, speech change, seizures, loss of consciousness and headaches.   Psychiatric/Behavioral:  Negative for depression, hallucinations and memory loss. The patient is not nervous/anxious.          OBJECTIVE:     Physical Exam    Visit Vitals  /77 (BP Location: Left arm, Patient Position: Sitting)   Pulse 79   Resp 16   Ht 5' 11" (1.803 m)   Wt 110.2 kg (243 lb)   BMI 33.89 kg/m²          General:  Pleasant, Well-nourished, Well-groomed.    Cardiovascular:  Heart has regular rate and rhythm.    Lungs:  Breathing is quiet, non-lablored    Musculoskeletal:  Incision: healing well, no significant drainage, no dehiscence, no significant erythema.  ROM is  evaluated given the patient's recent lumbar fusion    Neurological:  Muscle strength against resistance:   Right Left   Hip abduction 5/5 5/5   Hip adduction 5/5 5/5   Knee extension (L3) 5/5 5/5   Knee flexion (L4) 5/5 5/5   Dorsiflexion (L5) 5/5 5/5   EHL (L5) 5/5 5/5   Plantar flexion (S1) 5/5 5/5   Sensation is intact to primary modalities in lower extremities, except diminished in the left medial thigh and calf compared to the right.    Reflexes:   Right Left   Patellar (L4) 2+ 0   Achilles (S1) 2+ 2+   Negative Babinski, Clonus, Cruz, Tinel's, and Phalen's bilaterally.  Gait is normal  Coordination is normal.    Imaging:  All pertinent neuroimaging independently reviewed. Discussed these findings in detail with the patient.    ASSESSMENT:       ICD-10-CM ICD-9-CM   1. Status post lumbar spinal fusion  Z98.1 V45.4         PLAN:     1. Status post lumbar spinal fusion  - X-Ray Lumbar Spine Ap Lateral w/Flex Ext; Future    [facility] returns today denying intolerable pain.  I did take " the time to review his x-rays with he and his wife in clinic today which reveal stable postoperative changes.  He was advised to remain in his TLSO brace anytime the head of bed is greater than 30°.  We did discuss his 15 lb lifting restriction.  He was advised against any type of bending, stooping twisting or lifting activities.  He and his wife verbalized understanding at this time.  We will plan to follow up with the patient for a 3 month postoperative visit with updated x-rays for surveillance purposes prior to this visit.  He in his wife were advised to notify us with any regression of symptoms prior to that time.    Follow up in about 6 weeks (around 2/8/2024) for 3 month Post Op, With Imaging Prior to Appt.    E/M Level Based On Time:   15 minutes spent on reviewing chart, which includes interpreting lab results and diagnostic tests.   10 minutes spent in the room with the patient performing a history and physical exam, counseling or educating the patient/caregiver, prescribing medications, ordering labwork/diagnostic tests, or placing referrals.   5 minutes spent collaborating plan of care with physician.   5 minutes spent documenting all relevant clinical informationin the electronic health record.     Total Time Spent: 35 minutes            MAXIMILIANO Sam    Disclaimer:  This note is prepared using voice recognition software and as such is likely to have errors despite attempts at proofreading. Please contact me for questions.

## 2023-12-28 ENCOUNTER — HOSPITAL ENCOUNTER (OUTPATIENT)
Dept: RADIOLOGY | Facility: HOSPITAL | Age: 68
Discharge: HOME | End: 2023-12-28
Attending: PHYSICIAN ASSISTANT
Payer: COMMERCIAL

## 2023-12-28 ENCOUNTER — OFFICE VISIT (OUTPATIENT)
Dept: NEUROSURGERY | Facility: CLINIC | Age: 68
End: 2023-12-28
Payer: COMMERCIAL

## 2023-12-28 VITALS
BODY MASS INDEX: 34.02 KG/M2 | RESPIRATION RATE: 16 BRPM | SYSTOLIC BLOOD PRESSURE: 120 MMHG | DIASTOLIC BLOOD PRESSURE: 77 MMHG | HEART RATE: 79 BPM | HEIGHT: 71 IN | WEIGHT: 243 LBS

## 2023-12-28 DIAGNOSIS — M43.16 SPONDYLOLISTHESIS OF LUMBAR REGION: ICD-10-CM

## 2023-12-28 DIAGNOSIS — Z98.1 STATUS POST LUMBAR SPINAL FUSION: Primary | ICD-10-CM

## 2023-12-28 DIAGNOSIS — Z98.890 POST-OPERATIVE STATE: ICD-10-CM

## 2023-12-28 PROCEDURE — 1101F PR PT FALLS ASSESS DOC 0-1 FALLS W/OUT INJ PAST YR: ICD-10-PCS | Mod: CPTII,,, | Performed by: NURSE PRACTITIONER

## 2023-12-28 PROCEDURE — 99024 PR POST-OP FOLLOW-UP VISIT: ICD-10-PCS | Mod: ,,, | Performed by: NURSE PRACTITIONER

## 2023-12-28 PROCEDURE — 3074F PR MOST RECENT SYSTOLIC BLOOD PRESSURE < 130 MM HG: ICD-10-PCS | Mod: CPTII,,, | Performed by: NURSE PRACTITIONER

## 2023-12-28 PROCEDURE — 1160F PR REVIEW ALL MEDS BY PRESCRIBER/CLIN PHARMACIST DOCUMENTED: ICD-10-PCS | Mod: CPTII,,, | Performed by: NURSE PRACTITIONER

## 2023-12-28 PROCEDURE — 1159F MED LIST DOCD IN RCRD: CPT | Mod: CPTII,,, | Performed by: NURSE PRACTITIONER

## 2023-12-28 PROCEDURE — 3078F DIAST BP <80 MM HG: CPT | Mod: CPTII,,, | Performed by: NURSE PRACTITIONER

## 2023-12-28 PROCEDURE — 99024 POSTOP FOLLOW-UP VISIT: CPT | Mod: ,,, | Performed by: NURSE PRACTITIONER

## 2023-12-28 PROCEDURE — 72100 X-RAY EXAM L-S SPINE 2/3 VWS: CPT | Mod: TC

## 2023-12-28 PROCEDURE — 1160F RVW MEDS BY RX/DR IN RCRD: CPT | Mod: CPTII,,, | Performed by: NURSE PRACTITIONER

## 2023-12-28 PROCEDURE — 3288F FALL RISK ASSESSMENT DOCD: CPT | Mod: CPTII,,, | Performed by: NURSE PRACTITIONER

## 2023-12-28 PROCEDURE — 1159F PR MEDICATION LIST DOCUMENTED IN MEDICAL RECORD: ICD-10-PCS | Mod: CPTII,,, | Performed by: NURSE PRACTITIONER

## 2023-12-28 PROCEDURE — 3078F PR MOST RECENT DIASTOLIC BLOOD PRESSURE < 80 MM HG: ICD-10-PCS | Mod: CPTII,,, | Performed by: NURSE PRACTITIONER

## 2023-12-28 PROCEDURE — 1125F PR PAIN SEVERITY QUANTIFIED, PAIN PRESENT: ICD-10-PCS | Mod: CPTII,,, | Performed by: NURSE PRACTITIONER

## 2023-12-28 PROCEDURE — 3288F PR FALLS RISK ASSESSMENT DOCUMENTED: ICD-10-PCS | Mod: CPTII,,, | Performed by: NURSE PRACTITIONER

## 2023-12-28 PROCEDURE — 1125F AMNT PAIN NOTED PAIN PRSNT: CPT | Mod: CPTII,,, | Performed by: NURSE PRACTITIONER

## 2023-12-28 PROCEDURE — 1101F PT FALLS ASSESS-DOCD LE1/YR: CPT | Mod: CPTII,,, | Performed by: NURSE PRACTITIONER

## 2023-12-28 PROCEDURE — 3074F SYST BP LT 130 MM HG: CPT | Mod: CPTII,,, | Performed by: NURSE PRACTITIONER

## 2024-01-23 NOTE — PROGRESS NOTES
[facility]  History & Physical  Neurosurgery      Jose De La Vega   [id number]   1955     SUBJECTIVE:     CHIEF COMPLAINT:  3 month postoperative visit    HPI:  Jose De La Vega is a 68 y.o. male who presents for a 3 month post-operative follow-up.  He is s/p redo lumbar surgery involving posterior lateral fusion with instrumentation from L3-4 to L4-5 by Dr. Espinoza on 11/15/2023.  The patient had an uneventful recovery from surgery and was discharged home on 11/19/2023.    Prior to surgical intervention the patient reported lower back pain radiating into the right-greater-than-left lateral hip and lateral thigh.  He also describes decreased sensation along the anterior ankle and dorsal feet.  Described a cramping sensation in the proximal calves bilaterally as well as a heaviness intermittently into the lower extremities.  He reported his right foot would catch on the floor when he felt the heaviness in his legs.  He states increased standing would aggravate his symptoms significantly.  The patient has a significant history of L5-S1 ALIF with Dr. Howell on 9/5/2014.  Also underwent a bilateral L4-5 decompression and right SI joint fusion with Dr. Calix on 3/6/2020.  He had seen some relief following this procedure for approximately 9 months although unfortunately his symptoms recurred and became more problematic over the summer.    The patient returns today reporting the symptoms that are better since surgery.  He is denying any pain today.  He continues to have some numbness to the left medial thigh and calf which he states has been present following an episode of shingles several years ago.  He denies any new onset of weakness or numbness.  The patient rates the pain 0/10 on the pain scale today. The patient denies disturbances in bowel or bladder function.  There is no difficulty with balance. He denies any fevers. He states he has remained compliant with his TLSO brace.    Past  "Medical History:   Diagnosis Date    Arthritis     Essential (primary) hypertension     Fibromyalgia     Heart murmur     High cholesterol     Leg pain     Low back pain     Lumbar pain     Lumbar stenosis with neurogenic claudication     Migraine     Numbness and tingling of foot     Obesity     Ruptured lumbar disc     Seasonal allergies     Sleep apnea     uses CPAP    Spondylolisthesis of lumbosacral region        Past Surgical History:   Procedure Laterality Date    AORTIC VALVE REPLACEMENT      x2    CERVICAL SPINE SURGERY      x3    COLONOSCOPY      KNEE ARTHROSCOPY W/ MENISCAL REPAIR Left     LAMINECTOMY Bilateral 11/15/2023    Procedure: LAMINECTOMY, SPINE;  Surgeon: Lexie Espinoza MD;  Location: [facility];  Service: Neurosurgery;  Laterality: Bilateral;  L3-4, L4-5    LAMINOTOMY Left 11/15/2023    Procedure: LAMINOTOMY;  Surgeon: Lexie Espinoza MD;  Location: [facility];  Service: Neurosurgery;  Laterality: Left;  L4-5    LUMBAR SPINE SURGERY      2015 & 2020    POSTERIOR LUMBAR INTERBODY FUSION (PLIF) WITH COMPUTER-ASSISTED NAVIGATION N/A 11/15/2023    Procedure: FUSION, SPINE, LUMBAR, PLIF, USING COMPUTER-ASSISTED NAVIGATION;  Surgeon: Lexie Espinoza MD;  Location: [facility];  Service: Neurosurgery;  Laterality: N/A;  posterolateral fusion at L3-4, L4-5    TONSILLECTOMY      TRANSFORAMINAL EPIDURAL INJECTION OF STEROID Right 10/05/2023    Procedure: INJECTION, STEROID, EPIDURAL, TRANSFORAMINAL APPROACH Right L4 L5;  Surgeon: Florence Mcgee MD;  Location: [facility];  Service: Pain Management;  Laterality: Right;  Rt L4 & L5    VASECTOMY      WRIST SURGERY Right        Family History   Problem Relation Age of Onset    Anesthesia problems Mother         "trouble waking up"    No Known Problems Father        Social History     Socioeconomic History    Marital status:    Tobacco Use    Smoking status: Never    Smokeless tobacco: Never   Substance and Sexual Activity    Alcohol use: Yes     " "Alcohol/week: 2.0 standard drinks of alcohol     Types: 2 Shots of liquor per week    Drug use: Never     Social Determinants of Health     Financial Resource Strain: Low Risk  (11/15/2023)    Overall Financial Resource Strain (CARDIA)     Difficulty of Paying Living Expenses: Not hard at all   Food Insecurity: No Food Insecurity (11/15/2023)    Hunger Vital Sign     Worried About Running Out of Food in the Last Year: Never true     Ran Out of Food in the Last Year: Never true   Transportation Needs: No Transportation Needs (11/15/2023)    PRAPARE - Transportation     Lack of Transportation (Medical): No     Lack of Transportation (Non-Medical): No   Physical Activity: Unknown (11/15/2023)    Exercise Vital Sign     Days of Exercise per Week: 0 days   Stress: No Stress Concern Present (11/15/2023)    [employer] - Occupational Stress Questionnaire     Feeling of Stress : Not at all   Social Connections: Unknown (11/15/2023)    Social Connection and Isolation Panel [NHANES]     Frequency of Communication with Friends and Family: More than three times a week     Frequency of Social Gatherings with Friends and Family: More than three times a week     Active Member of Clubs or Organizations: Yes     Attends Club or Organization Meetings: More than 4 times per year     Marital Status:    Housing Stability: Low Risk  (11/15/2023)    Housing Stability Vital Sign     Unable to Pay for Housing in the Last Year: No     Number of Places Lived in the Last Year: 1     Unstable Housing in the Last Year: No       Review of patient's allergies indicates:   Allergen Reactions    [id number] antimigraine agents Shortness Of Breath and Itching     "Itching of throat"        Current Outpatient Medications   Medication Instructions    aspirin (ECOTRIN) 325 mg, Oral, Daily    atorvastatin (LIPITOR) 40 mg, Oral, Daily    chlorpheniramine (CHLOR-TRIMETON) 4 mg, Oral, Nightly    CMPD testosterone " proprionate 2% in vanicream Topical (Top), Daily, 200 mg/gm    co-enzyme Q-10 50 mg, Oral, Daily    cyclobenzaprine (FLEXERIL) 10 mg, Oral, 3 times daily PRN    ezetimibe (ZETIA) 10 mg, Oral, Daily    fluticasone propionate (FLONASE) 50 mcg/actuation nasal spray 1 spray, Each Nostril, 2 times daily    furosemide (LASIX) 40 mg, Oral, Daily    INV potassium chloride SA (K-DUR,KLOR-CON) 20 mEq Tab No dose, route, or frequency recorded.    ipratropium (ATROVENT) 42 mcg (0.06 %) nasal spray 2 sprays, Nasal, 2 times daily    levocetirizine (XYZAL) 5 mg, Oral, Daily    metoprolol succinate (TOPROL-XL) 25 mg, Oral, Daily    multivitamin-minerals-lutein (MULTIVITAMIN 50 PLUS) Tab 1 tablet, Oral, Daily    NURTEC 75 mg odt SMARTSI Tablet(s) Sublingual Every Other Day    omega 3-dha-epa-fish oil (FISH OIL) 100-160-1,000 mg Cap No dose, route, or frequency recorded.    oxyCODONE-acetaminophen (PERCOCET)  mg per tablet 1 tablet, Oral, Every 12 hours PRN    tadalafiL (CIALIS) 5 mg, Oral, Daily    tamsulosin (FLOMAX) 0.4 mg, Oral, Daily    traZODone (DESYREL) 75 mg, Oral, Nightly          Review of Systems   Constitutional:  Negative for chills, fever and weight loss.   HENT:  Negative for congestion, hearing loss, nosebleeds and tinnitus.    Eyes:  Negative for blurred vision, double vision and photophobia.   Respiratory:  Negative for cough, shortness of breath and wheezing.    Cardiovascular:  Negative for chest pain, palpitations and leg swelling.   Gastrointestinal:  Negative for constipation, diarrhea, nausea and vomiting.   Genitourinary:  Negative for dysuria, frequency and urgency.   Musculoskeletal:  Negative for back pain, falls and neck pain.   Skin:  Negative for itching and rash.   Neurological:  Positive for tingling (left medial leg). Negative for dizziness, tremors, sensory change, speech change, seizures, loss of consciousness and headaches.   Psychiatric/Behavioral:  Negative for depression,  "hallucinations and memory loss. The patient is not nervous/anxious.          OBJECTIVE:     Physical Exam    Visit Vitals  /89 (BP Location: Left arm, Patient Position: Sitting)   Pulse 66   Resp 16   Ht 5' 11" (1.803 m)   Wt 112.9 kg (249 lb)   BMI 34.73 kg/m²            General:  Pleasant, Well-nourished, Well-groomed.    Cardiovascular:  Heart has regular rate and rhythm.    Lungs:  Breathing is quiet, non-lablored    Musculoskeletal:  Incision: healing well, no significant drainage, no dehiscence, no significant erythema.      Neurological:  Muscle strength against resistance:   Right Left   Hip abduction 5/5 5/5   Hip adduction 5/5 5/5   Knee extension (L3) 5/5 5/5   Knee flexion (L4) 5/5 5/5   Dorsiflexion (L5) 5/5 5/5   EHL (L5) 5/5 5/5   Plantar flexion (S1) 5/5 5/5   Sensation is intact to primary modalities in lower extremities, except diminished in the left medial thigh and calf compared to the right.    Reflexes:   Right Left   Patellar (L4) 2+ 0   Achilles (S1) 2+ 2+   Negative Babinski, Clonus, Cruz, Tinel's, and Phalen's bilaterally.  Gait is normal  Coordination is normal.    Imaging:  All pertinent neuroimaging independently reviewed. Discussed these findings in detail with the patient.    Updated x-rays of the lumbar spine reveal staple postoperative changes without any abnormal motion on extension or flexion.    ASSESSMENT:       ICD-10-CM ICD-9-CM   1. Status post lumbar spinal fusion  Z98.1 V45.4     PLAN:     1. Status post lumbar spinal fusion      [facility]  I did take the time to review his x-rays with him in clinic today which reveal stable postoperative changes.  He was advised to begin weaning out of his TLSO brace today.  He was encouraged to continue on with the bone growth stimulator 2 hours daily.  He was encouraged to continue on with home exercises and structures as previously provided by outpatient physical therapy.  " Going forward we will plan to see the patient back on an as-needed basis.  He was advised to notify us with any regression of symptoms in the future.     Follow up if symptoms worsen or fail to improve.    E/M Level Based On Time:   15 minutes spent on reviewing chart, which includes interpreting lab results and diagnostic tests.   10 minutes spent in the room with the patient performing a history and physical exam, counseling or educating the patient/caregiver, prescribing medications, ordering labwork/diagnostic tests, or placing referrals.   0 minutes spent collaborating plan of care with physician.   5 minutes spent documenting all relevant clinical informationin the electronic health record.     Total Time Spent: 30 minutes                  MAXIMILIANO Sam    Disclaimer:  This note is prepared using voice recognition software and as such is likely to have errors despite attempts at proofreading. Please contact me for questions.

## 2024-02-08 ENCOUNTER — HOSPITAL ENCOUNTER (OUTPATIENT)
Dept: RADIOLOGY | Facility: HOSPITAL | Age: 69
Discharge: HOME | End: 2024-02-08
Attending: NURSE PRACTITIONER
Payer: COMMERCIAL

## 2024-02-08 ENCOUNTER — OFFICE VISIT (OUTPATIENT)
Dept: NEUROSURGERY | Facility: CLINIC | Age: 69
End: 2024-02-08
Payer: COMMERCIAL

## 2024-02-08 VITALS
BODY MASS INDEX: 34.86 KG/M2 | HEIGHT: 71 IN | SYSTOLIC BLOOD PRESSURE: 134 MMHG | HEART RATE: 66 BPM | WEIGHT: 249 LBS | DIASTOLIC BLOOD PRESSURE: 89 MMHG | RESPIRATION RATE: 16 BRPM

## 2024-02-08 DIAGNOSIS — Z98.1 STATUS POST LUMBAR SPINAL FUSION: ICD-10-CM

## 2024-02-08 DIAGNOSIS — Z98.1 STATUS POST LUMBAR SPINAL FUSION: Primary | ICD-10-CM

## 2024-02-08 PROCEDURE — 99214 OFFICE O/P EST MOD 30 MIN: CPT | Mod: ,,, | Performed by: NURSE PRACTITIONER

## 2024-02-08 PROCEDURE — 72110 X-RAY EXAM L-2 SPINE 4/>VWS: CPT | Mod: TC

## 2024-02-08 PROCEDURE — 3079F DIAST BP 80-89 MM HG: CPT | Mod: CPTII,,, | Performed by: NURSE PRACTITIONER

## 2024-02-08 PROCEDURE — 3008F BODY MASS INDEX DOCD: CPT | Mod: CPTII,,, | Performed by: NURSE PRACTITIONER

## 2024-02-08 PROCEDURE — 1159F MED LIST DOCD IN RCRD: CPT | Mod: CPTII,,, | Performed by: NURSE PRACTITIONER

## 2024-02-08 PROCEDURE — 3075F SYST BP GE 130 - 139MM HG: CPT | Mod: CPTII,,, | Performed by: NURSE PRACTITIONER

## 2024-02-08 PROCEDURE — 1126F AMNT PAIN NOTED NONE PRSNT: CPT | Mod: CPTII,,, | Performed by: NURSE PRACTITIONER

## 2024-02-08 PROCEDURE — 1160F RVW MEDS BY RX/DR IN RCRD: CPT | Mod: CPTII,,, | Performed by: NURSE PRACTITIONER

## 2024-02-08 PROCEDURE — 3288F FALL RISK ASSESSMENT DOCD: CPT | Mod: CPTII,,, | Performed by: NURSE PRACTITIONER

## 2024-02-08 PROCEDURE — 1101F PT FALLS ASSESS-DOCD LE1/YR: CPT | Mod: CPTII,,, | Performed by: NURSE PRACTITIONER

## (undated) DEVICE — GOWN X-LG STERILE BACK

## (undated) DEVICE — TRAY CATH FOL SIL URIMTR 16FR

## (undated) DEVICE — BLADE EZ CLEAN 2 1/2

## (undated) DEVICE — NDL SYR 10ML 18X1.5 LL BLUNT

## (undated) DEVICE — DRESSING XEROFORM NONADH 1X8IN

## (undated) DEVICE — COVER STERILE-Z BACK TABLE XL

## (undated) DEVICE — DRAPE UTILITY W/ TAPE 20X30IN

## (undated) DEVICE — ELECTRODE PATIENT RETURN DISP

## (undated) DEVICE — BANDAGE SHEER STRIP 3/4X3IN

## (undated) DEVICE — TUBE SUCTION MEDI-VAC STERILE

## (undated) DEVICE — SUT VICRYL PLUS 0 CT-1 18IN

## (undated) DEVICE — SPHERE NDI PASSIVE

## (undated) DEVICE — DISH PETRI MED 3.5IN

## (undated) DEVICE — NDL QUINCKE S/SU 22GA 5IN

## (undated) DEVICE — COVER HD BACK TABLE 6FT

## (undated) DEVICE — PROBE BALL TIP 2.3MM

## (undated) DEVICE — GAUZE SPONGE 4X4 12PLY

## (undated) DEVICE — PAD DERMAPROX XL 22X14X.5IN

## (undated) DEVICE — ELECTRODE BLADE E-Z CLEAN 4IN

## (undated) DEVICE — DRAPE ORTH SPLIT 77X108IN

## (undated) DEVICE — CHLORAPREP 10.5 ML APPLICATOR

## (undated) DEVICE — SUT VICRYL 2 0 CT 2

## (undated) DEVICE — GLOVE PROTEXIS PI SYN SURG 7

## (undated) DEVICE — NDL FLTR 5MCRN BLNT TIP 18GX1

## (undated) DEVICE — DRAPE C-ARM COVER EZ 36X28IN

## (undated) DEVICE — DRAPE MEDIUM SHEET 40X70IN

## (undated) DEVICE — BUR BONE CUT MICRO TPS 3X3.8MM

## (undated) DEVICE — SYR 3ML LL 18GA 1.5IN

## (undated) DEVICE — CANNULA AIRLIFE ETCO2 NSL 7FT

## (undated) DEVICE — GLOVE PROTEXIS PI SYN SURG 6.5

## (undated) DEVICE — KIT SURGIFLO HEMOSTATIC MATRIX

## (undated) DEVICE — CONTRAST ISOVUE M 200 20ML VIL

## (undated) DEVICE — KIT DRAIN WOUND RND SPRNG RESV

## (undated) DEVICE — TAPE CLOTH SOFT MEDIPORE 4IN

## (undated) DEVICE — MARKER WRITESITE SKIN CHLRAPRP

## (undated) DEVICE — Device

## (undated) DEVICE — PILLOW HEAD REST

## (undated) DEVICE — KIT SURGICAL TURNOVER

## (undated) DEVICE — STAPLER SKIN PROXIMATE WIDE

## (undated) DEVICE — DRAPE TOP 53X102IN

## (undated) DEVICE — KIT POS JACKSON TABLE NO HDRST

## (undated) DEVICE — SOL NACL IRR 1000ML BTL

## (undated) DEVICE — SPONGE PATTY NEURO SGL 0.5X6

## (undated) DEVICE — BENZOIN TINCTURE 0.66ML

## (undated) DEVICE — TRAY SKIN SCRUB WET PREMIUM

## (undated) DEVICE — NDL HYPO REG 25G X 1 1/2

## (undated) DEVICE — DRAPE C-ARMOR EQUIPMENT COVER

## (undated) DEVICE — SET SMARTSITE EXT SMALLBORE NF

## (undated) DEVICE — GLOVE PROTEXIS LTX MICRO 6.5

## (undated) DEVICE — NDL HYPO 22GX1 1/2 SYR 10ML LL

## (undated) DEVICE — SPONGE SURGIFOAM 100 8.5X12X10

## (undated) DEVICE — DRAPE SURG W/TWL 17 5/8X23